# Patient Record
Sex: MALE | Race: BLACK OR AFRICAN AMERICAN | NOT HISPANIC OR LATINO | Employment: OTHER | URBAN - METROPOLITAN AREA
[De-identification: names, ages, dates, MRNs, and addresses within clinical notes are randomized per-mention and may not be internally consistent; named-entity substitution may affect disease eponyms.]

---

## 2017-01-25 ENCOUNTER — OFFICE VISIT (OUTPATIENT)
Dept: PAIN MEDICINE | Facility: CLINIC | Age: 52
End: 2017-01-25

## 2017-01-25 ENCOUNTER — RESULTS ENCOUNTER (OUTPATIENT)
Dept: PAIN MEDICINE | Facility: CLINIC | Age: 52
End: 2017-01-25

## 2017-01-25 VITALS
TEMPERATURE: 98.1 F | HEART RATE: 82 BPM | WEIGHT: 315 LBS | HEIGHT: 69 IN | RESPIRATION RATE: 18 BRPM | BODY MASS INDEX: 46.65 KG/M2 | OXYGEN SATURATION: 97 %

## 2017-01-25 DIAGNOSIS — G89.29 OTHER CHRONIC PAIN: Primary | ICD-10-CM

## 2017-01-25 DIAGNOSIS — M51.36 DDD (DEGENERATIVE DISC DISEASE), LUMBAR: ICD-10-CM

## 2017-01-25 DIAGNOSIS — M17.0 OSTEOARTHRITIS OF BOTH KNEES, UNSPECIFIED OSTEOARTHRITIS TYPE: ICD-10-CM

## 2017-01-25 DIAGNOSIS — G89.29 OTHER CHRONIC PAIN: ICD-10-CM

## 2017-01-25 DIAGNOSIS — Z79.899 ENCOUNTER FOR LONG-TERM (CURRENT) USE OF HIGH-RISK MEDICATION: ICD-10-CM

## 2017-01-25 DIAGNOSIS — M54.16 LUMBAR RADICULOPATHY: ICD-10-CM

## 2017-01-25 PROCEDURE — 99214 OFFICE O/P EST MOD 30 MIN: CPT | Performed by: NURSE PRACTITIONER

## 2017-01-25 RX ORDER — ALLOPURINOL 100 MG/1
TABLET ORAL
Refills: 1 | COMMUNITY
Start: 2017-01-20

## 2017-01-25 RX ORDER — HYDROCODONE BITARTRATE AND ACETAMINOPHEN 10; 325 MG/1; MG/1
1 TABLET ORAL EVERY 6 HOURS PRN
Qty: 120 TABLET | Refills: 0 | Status: SHIPPED | OUTPATIENT
Start: 2017-01-25 | End: 2017-02-24 | Stop reason: SDUPTHER

## 2017-01-25 RX ORDER — COLCHICINE 0.6 MG/1
TABLET, FILM COATED ORAL
COMMUNITY
Start: 2017-01-21

## 2017-01-25 RX ORDER — GABAPENTIN 300 MG/1
600 CAPSULE ORAL 3 TIMES DAILY
Qty: 180 CAPSULE | Refills: 1 | Status: SHIPPED | OUTPATIENT
Start: 2017-01-25 | End: 2017-03-24 | Stop reason: SDUPTHER

## 2017-01-25 NOTE — PROGRESS NOTES
CHIEF COMPLAINT  Knee pain, gout    Subjective   Artie Cameron is a 51 y.o. male  who presents to the office for follow-up.He has a history of chronic bilateral knee pain and low back pain.    He saw Dr. Boyd 12-.  Nothing surgical to offer because of his medical co-morbidities.      He reports that he had a severe case of gout over the weekend, went to the ER, is improving with medication (Allopurinol and Colchicine were started).    He reports knee and low back pain.  It is constant.  Today it is an 8/10 in severity.  Continues with Hydrocodone 10/325 4/day and gabapentin 300 mg 4/day. Denies any side effects from the regimen. Denies any side effects from the regimen. This helps decrease his pain moderately. ADL's by self.     Knee Pain    Incident onset: chronic. The pain is present in the left knee and right knee. The quality of the pain is described as aching (throbbing). The pain is at a severity of 8/10. The pain is severe. The pain has been worsening since onset. Associated symptoms include an inability to bear weight and a loss of motion. Pertinent negatives include no numbness. The symptoms are aggravated by movement and weight bearing. He has tried rest (norco) for the symptoms. The treatment provided moderate relief.   Back Pain   This is a new problem. The problem occurs constantly. The problem has been gradually worsening since onset. The pain is present in the lumbar spine. The quality of the pain is described as aching and burning. The pain is at a severity of 8/10. The pain is moderate. Associated symptoms include weakness. Pertinent negatives include no chest pain, dysuria, fever, headaches or numbness. Risk factors include lack of exercise, obesity and sedentary lifestyle. He has tried analgesics for the symptoms. The treatment provided mild relief.      PEG Assessment   What number best describes your pain on average in the past week?8  What number best describes how, during the past week,  "pain has interfered with your enjoyment of life?8  What number best describes how, during the past week, pain has interfered with your general activity?  8    The following portions of the patient's history were reviewed and updated as appropriate: allergies, current medications, past family history, past medical history, past social history, past surgical history and problem list.    Review of Systems   Constitutional: Negative for chills and fever.   Respiratory: Positive for shortness of breath.    Cardiovascular: Negative for chest pain.   Gastrointestinal: Negative for constipation, diarrhea, nausea and vomiting.   Genitourinary: Negative for difficulty urinating and dysuria.   Musculoskeletal: Positive for back pain.        Knee pain, gout   Neurological: Positive for weakness. Negative for dizziness, light-headedness, numbness and headaches.   Psychiatric/Behavioral: Negative for confusion, hallucinations, self-injury, sleep disturbance and suicidal ideas. The patient is not nervous/anxious.      Vitals:    01/25/17 0830   Pulse: 82   Resp: 18   Temp: 98.1 °F (36.7 °C)   SpO2: 97%   Weight: (!) 317 lb (144 kg)   Height: 69\" (175.3 cm)   PainSc:   8   PainLoc: Knee     Objective   Physical Exam   Constitutional: He is oriented to person, place, and time. Vital signs are normal. He appears well-developed and well-nourished. He is cooperative.   HENT:   Head: Normocephalic and atraumatic.   Nose: Nose normal.   Eyes: Conjunctivae and lids are normal.   Cardiovascular:   No heart beat with auscultation, LVAD patient    Pulmonary/Chest: Effort normal and breath sounds normal. No respiratory distress.   Abdominal: Normal appearance.   Musculoskeletal:        Right knee: He exhibits decreased range of motion and effusion. Tenderness found.        Left knee: He exhibits decreased range of motion and effusion. Tenderness found.        Lumbar back: He exhibits tenderness.   Neurological: He is alert and oriented to " person, place, and time. Gait (wheelchair) abnormal.   Patient reports dysthesias of BLE's   Skin: Skin is warm, dry and intact.   Psychiatric: He has a normal mood and affect. His speech is normal and behavior is normal. Judgment and thought content normal. Cognition and memory are normal.   Nursing note and vitals reviewed.    Assessment/Plan   Artie was seen today for knee pain.    Diagnoses and all orders for this visit:    Other chronic pain    Osteoarthritis of both knees, unspecified osteoarthritis type    DDD (degenerative disc disease), lumbar    Lumbar radiculopathy    Encounter for long-term (current) use of high-risk medication    Other orders  -     HYDROcodone-acetaminophen (NORCO)  MG per tablet; Take 1 tablet by mouth Every 6 (Six) Hours As Needed for moderate pain (4-6).  -     gabapentin (NEURONTIN) 300 MG capsule; Take 2 capsules by mouth 3 (Three) Times a Day.      --- Increase Neurontin to 600 mg TID  --- The urine drug screen confirmation from 6-14-16 has been reviewed and the result is appropriate based on patient history and NATALIE report  --- Refill Hydrocodone. Patient appears stable with current regimen. No adverse effects. Regarding continuation of opioids, there is no evidence of aberrant behavior or any red flags. The patient continues with appropriate response to opioid therapy. ADL's remain intact by self.   --- Follow-up 1 month          NATALIE REPORT    As part of the patient's treatment plan, I am prescribing controlled substances. The patient has been made aware of appropriate use of such medications, including potential risk of somnolence, limited ability to drive and/or work safely, and the potential for dependence or overdose. It has also bee made clear that these medications are for use by this patient only, without concomitant use of alcohol or other substances unless prescribed.     Patient has completed prescribing agreement detailing terms of continued prescribing of  controlled substances, including monitoring NATALIE reports, urine drug screening, and pill counts if necessary. The patient is aware that inappropriate use will results in cessation of prescribing such medications.    NATALIE report has been reviewed and scanned into the patient's chart.    Date of last NATALIE : 12- need new    History and physical exam exhibit continued safe and appropriate use of controlled substances.    EMR Dragon/Transcription disclaimer:   Much of this encounter note is an electronic transcription/translation of spoken language to printed text. The electronic translation of spoken language may permit erroneous, or at times, nonsensical words or phrases to be inadvertently transcribed; Although I have reviewed the note for such errors, some may still exist.

## 2017-01-25 NOTE — MR AVS SNAPSHOT
Artie Cameron   1/25/2017 8:40 AM   Office Visit    Dept Phone:  [phone]   Encounter #:  [id number]    Provider:  SANTOSH Green   Department:  [facility] PAIN MANAGEMENT                Your Full Care Plan              Today's Medication Changes          These changes are accurate as of: 1/25/17  8:59 AM.  If you have any questions, ask your nurse or doctor.               Medication(s)that have changed:     gabapentin 300 MG capsule   Commonly known as:  NEURONTIN   Take 2 capsules by mouth 3 (Three) Times a Day.   What changed:    - how much to take  - when to take this   Changed by:  SANTOSH Green            Where to Get Your Medications      These medications were sent to [facility] [address] [place] [address] [phone] [facility] [phone] [address] [address]    Hours:  24-hours Phone:  [phone]     gabapentin 300 MG capsule         You can get these medications from any pharmacy     Bring a paper prescription for each of these medications     HYDROcodone-acetaminophen  MG per tablet                  Your Updated Medication List          This list is accurate as of: 1/25/17  8:59 AM.  Always use your most recent med list.                ACCU-CHEK ABDELRAHMAN PLUS test strip   Generic drug:  glucose blood       ACCU-CHEK ABDELRAHMAN PLUS W/DEVICE kit       ACCU-CHEK SOFTCLIX LANCETS lancets       ADVAIR DISKUS 250-50 MCG/DOSE DISKUS   Generic drug:  fluticasone-salmeterol       allopurinol 100 MG tablet   Commonly known as:  ZYLOPRIM       carvedilol 25 MG tablet   Commonly known as:  COREG       clopidogrel 75 MG tablet   Commonly known as:  PLAVIX       COLCRYS 0.6 MG tablet   Generic drug:  colchicine       doxycycline 100 MG capsule   Commonly known as:  MONODOX       FLUOCINOLONE ACETONIDE SCALP 0.01 % oil       fluticasone 50 MCG/ACT nasal spray   Commonly known as:   "FLONASE       gabapentin 300 MG capsule   Commonly known as:  NEURONTIN   Take 2 capsules by mouth 3 (Three) Times a Day.       HUMALOG 100 UNIT/ML injection   Generic drug:  insulin lispro       hydrALAZINE 50 MG tablet   Commonly known as:  APRESOLINE       HYDROcodone-acetaminophen  MG per tablet   Commonly known as:  NORCO   Take 1 tablet by mouth Every 6 (Six) Hours As Needed for moderate pain (4-6).       INCRUSE ELLIPTA 62.5 MCG/INH aerosol powder    Generic drug:  Umeclidinium Bromide       Insulin Syringe 31G X 5/16\" 1 ML misc       ipratropium-albuterol 0.5-2.5 mg/mL nebulizer   Commonly known as:  DUO-NEB       isosorbide mononitrate 30 MG 24 hr tablet   Commonly known as:  IMDUR       LEVEMIR FLEXTOUCH 100 UNIT/ML injection   Generic drug:  insulin detemir       levothyroxine 100 MCG tablet   Commonly known as:  SYNTHROID, LEVOTHROID       * lisinopril 20 MG tablet   Commonly known as:  PRINIVIL,ZESTRIL       * lisinopril 10 MG tablet   Commonly known as:  PRINIVIL,ZESTRIL       MAGNESIUM-OXIDE 400 (241.3 MG) MG tablet tablet   Generic drug:  magnesium oxide       omeprazole 20 MG capsule   Commonly known as:  priLOSEC       potassium chloride 20 MEQ CR tablet   Commonly known as:  K-DUR,KLOR-CON       potassium chloride ER 20 MEQ tablet controlled-release ER tablet   Commonly known as:  K-TAB       predniSONE 20 MG tablet   Commonly known as:  DELTASONE       * PROAIR  (90 BASE) MCG/ACT inhaler   Generic drug:  albuterol       * albuterol (2.5 MG/3ML) 0.083% nebulizer solution   Commonly known as:  PROVENTIL       SPIRIVA HANDIHALER 18 MCG per inhalation capsule   Generic drug:  tiotropium       spironolactone 25 MG tablet   Commonly known as:  ALDACTONE       torsemide 20 MG tablet   Commonly known as:  DEMADEX       warfarin 1 MG tablet   Commonly known as:  COUMADIN       * Notice:  This list has 4 medication(s) that are the same as other medications prescribed for you. Read the " "directions carefully, and ask your doctor or other care provider to review them with you.            You Were Diagnosed With        Codes Comments    Other chronic pain    -  Primary ICD-10-CM: G89.29  ICD-9-CM: 338.29     Osteoarthritis of both knees, unspecified osteoarthritis type     ICD-10-CM: M17.0  ICD-9-CM: 715.96     DDD (degenerative disc disease), lumbar     ICD-10-CM: M51.36  ICD-9-CM: 722.52     Lumbar radiculopathy     ICD-10-CM: M54.16  ICD-9-CM: 724.4     Encounter for long-term (current) use of high-risk medication     ICD-10-CM: Z79.899  ICD-9-CM: V58.69       Instructions     None    Patient Instructions History      Upcoming Appointments     Visit Type Date Time Department    OFFICE VISIT 2017  8:40 AM [facility]      [employer] Signup     [facility] [employer] allows you to send messages to your doctor, view your test results, renew your prescriptions, schedule appointments, and more. To sign up, go to [employer] and click on the Sign Up Now link in the New User? box. Enter your [employer] Activation Code exactly as it appears below along with the last four digits of your Social Security Number and your Date of Birth () to complete the sign-up process. If you do not sign up before the expiration date, you must request a new code.    [employer] Activation Code: [email or web address]  Expires: 2017  5:34 AM    If you have questions, you can email [email or web address] or call [phone] to talk to our [employer] staff. Remember, [employer] is NOT to be used for urgent needs. For medical emergencies, dial 911.               Other Info from Your Visit           Allergies     Phenergan [Promethazine Hcl]  Shortness Of Breath      Reason for Visit     Knee Pain           Vital Signs     Pulse Temperature Respirations Height Weight Oxygen Saturation    82 98.1 °F (36.7 °C) 18 69\" (175.3 cm) 317 lb (144 kg) 97%    Body Mass Index Smoking Status                46.81 kg/m2 " Former Smoker          Problems and Diagnoses Noted     Chronic pain    Degeneration of lumbar or lumbosacral intervertebral disc    Encounter for long-term (current) use of high-risk medication    Lumbar nerve root disorder    Osteoarthritis of both knees

## 2017-02-24 ENCOUNTER — OFFICE VISIT (OUTPATIENT)
Dept: PAIN MEDICINE | Facility: CLINIC | Age: 52
End: 2017-02-24

## 2017-02-24 VITALS — WEIGHT: 315 LBS | BODY MASS INDEX: 46.65 KG/M2 | HEIGHT: 69 IN | RESPIRATION RATE: 16 BRPM

## 2017-02-24 DIAGNOSIS — Z79.899 ENCOUNTER FOR LONG-TERM (CURRENT) USE OF HIGH-RISK MEDICATION: ICD-10-CM

## 2017-02-24 DIAGNOSIS — M17.0 OSTEOARTHRITIS OF BOTH KNEES, UNSPECIFIED OSTEOARTHRITIS TYPE: ICD-10-CM

## 2017-02-24 DIAGNOSIS — M54.16 LUMBAR RADICULOPATHY: ICD-10-CM

## 2017-02-24 DIAGNOSIS — M51.36 DDD (DEGENERATIVE DISC DISEASE), LUMBAR: ICD-10-CM

## 2017-02-24 DIAGNOSIS — G89.29 OTHER CHRONIC PAIN: Primary | ICD-10-CM

## 2017-02-24 LAB
POC AMPHETAMINES: NEGATIVE
POC BARBITURATES: NEGATIVE
POC BENZODIAZEPHINES: NEGATIVE
POC COCAINE: NEGATIVE
POC METHADONE: NEGATIVE
POC METHAMPHETAMINE SCREEN URINE: NEGATIVE
POC OPIATES: POSITIVE
POC OXYCODONE: NEGATIVE
POC PHENCYCLIDINE: NEGATIVE
POC PROPOXYPHENE: NEGATIVE
POC THC: NEGATIVE
POC TRICYCLIC ANTIDEPRESSANTS: NEGATIVE

## 2017-02-24 PROCEDURE — 80305 DRUG TEST PRSMV DIR OPT OBS: CPT | Performed by: NURSE PRACTITIONER

## 2017-02-24 PROCEDURE — 99213 OFFICE O/P EST LOW 20 MIN: CPT | Performed by: NURSE PRACTITIONER

## 2017-02-24 RX ORDER — HYDROCODONE BITARTRATE AND ACETAMINOPHEN 10; 325 MG/1; MG/1
1 TABLET ORAL EVERY 6 HOURS PRN
Qty: 120 TABLET | Refills: 0 | Status: SHIPPED | OUTPATIENT
Start: 2017-02-24 | End: 2017-03-24 | Stop reason: SDUPTHER

## 2017-02-24 NOTE — PROGRESS NOTES
CHIEF COMPLAINT  Pt continues with significant bilateral LBP,basically unchanged,tolerably controlled overall.    Subjective   Artie Cameron is a 52 y.o. male  who presents to the office for follow-up.He has a history of chronic low back and bilateral knee pain.  Pain is unchanged today, moderately controlled overall.  Pain is constant.  Ranges from 6-8/10 in severity. Continues with Hydrocodone 10/325 4/day and gabapentin 600 mg 3/day. Denies any side effects from the regimen. Denies any side effects from the regimen. This helps decrease his pain moderately. ADL's by self.     Knee Pain    Incident onset: chronic. The pain is present in the left knee and right knee. The quality of the pain is described as aching (throbbing). The pain is at a severity of 8/10. The pain is severe. The pain has been worsening since onset. Associated symptoms include an inability to bear weight and a loss of motion. Pertinent negatives include no numbness. The symptoms are aggravated by movement and weight bearing. He has tried rest (norco) for the symptoms. The treatment provided moderate relief.   Back Pain   This is a new problem. The problem occurs constantly. The problem is unchanged. The pain is present in the lumbar spine. The quality of the pain is described as aching and burning. The pain is at a severity of 8/10. The pain is moderate. Associated symptoms include weakness. Pertinent negatives include no chest pain, dysuria, fever, headaches or numbness. Risk factors include lack of exercise, obesity and sedentary lifestyle. He has tried analgesics for the symptoms. The treatment provided mild relief.      PEG Assessment   What number best describes your pain on average in the past week?8  What number best describes how, during the past week, pain has interfered with your enjoyment of life?8  What number best describes how, during the past week, pain has interfered with your general activity?  8    The following portions of the  "patient's history were reviewed and updated as appropriate: allergies, current medications, past family history, past medical history, past social history, past surgical history and problem list.    Review of Systems   Constitutional: Negative for chills and fever.   Respiratory: Positive for apnea and shortness of breath. Negative for chest tightness.    Cardiovascular: Negative for chest pain.   Gastrointestinal: Negative for constipation, diarrhea, nausea and vomiting.   Genitourinary: Negative for difficulty urinating and dysuria.   Musculoskeletal: Positive for back pain.        Knee pain, gout   Neurological: Positive for weakness. Negative for dizziness, light-headedness, numbness and headaches.   Psychiatric/Behavioral: Negative for confusion, hallucinations, self-injury, sleep disturbance and suicidal ideas. The patient is not nervous/anxious.      Vitals:    02/24/17 0802   Resp: 16   Weight: (!) 324 lb (147 kg)   Height: 69\" (175.3 cm)   PainSc: 8  Comment: LBP bilaterally ranges from 6-8/10   PainLoc: Back     Objective   Physical Exam   Constitutional: He is oriented to person, place, and time. Vital signs are normal. He appears well-developed and well-nourished. He is cooperative.   HENT:   Head: Normocephalic and atraumatic.   Nose: Nose normal.   Eyes: Conjunctivae and lids are normal.   Cardiovascular:   No heart beat with auscultation, LVAD patient    Pulmonary/Chest: Effort normal and breath sounds normal. No respiratory distress.   Abdominal: Normal appearance.   Musculoskeletal:        Right knee: He exhibits decreased range of motion. Tenderness found.        Left knee: He exhibits decreased range of motion. Tenderness found.        Lumbar back: He exhibits tenderness.   Neurological: He is alert and oriented to person, place, and time. Gait (wheelchair) abnormal.   Patient reports dysthesias of BLE's   Skin: Skin is warm, dry and intact.   Psychiatric: He has a normal mood and affect. His " speech is normal and behavior is normal. Judgment and thought content normal. Cognition and memory are normal.   Nursing note and vitals reviewed.    Assessment/Plan   Artie was seen today for back pain.    Diagnoses and all orders for this visit:    Other chronic pain  -     Urine Drug Screen Confirmation  -     POC Urine Drug Screen, Triage    DDD (degenerative disc disease), lumbar  -     Urine Drug Screen Confirmation  -     POC Urine Drug Screen, Triage    Lumbar radiculopathy  -     Urine Drug Screen Confirmation  -     POC Urine Drug Screen, Triage    Osteoarthritis of both knees, unspecified osteoarthritis type  -     Urine Drug Screen Confirmation  -     POC Urine Drug Screen, Triage    Encounter for long-term (current) use of high-risk medication  -     Urine Drug Screen Confirmation  -     POC Urine Drug Screen, Triage    Other orders  -     HYDROcodone-acetaminophen (NORCO)  MG per tablet; Take 1 tablet by mouth Every 6 (Six) Hours As Needed for moderate pain (4-6).      --- The urine drug screen confirmation from 6-14-16 has been reviewed and the result is appropriate based on patient history and NATALIE report  --- Refill Hydrocodone. Patient appears stable with current regimen. No adverse effects. Regarding continuation of opioids, there is no evidence of aberrant behavior or any red flags. The patient continues with appropriate response to opioid therapy. ADL's remain intact by self.   --- Follow-up 1 month         NATALIE REPORT  As part of the patient's treatment plan, I am prescribing controlled substances. The patient has been made aware of appropriate use of such medications, including potential risk of somnolence, limited ability to drive and/or work safely, and the potential for dependence or overdose. It has also bee made clear that these medications are for use by this patient only, without concomitant use of alcohol or other substances unless prescribed.     Patient has completed  prescribing agreement detailing terms of continued prescribing of controlled substances, including monitoring NATALIE reports, urine drug screening, and pill counts if necessary. The patient is aware that inappropriate use will results in cessation of prescribing such medications.    NATALIE report has been reviewed and scanned into the patient's chart.    Date of last NATALIE : 2-    History and physical exam exhibit continued safe and appropriate use of controlled substances.    EMR Dragon/Transcription disclaimer:   Much of this encounter note is an electronic transcription/translation of spoken language to printed text. The electronic translation of spoken language may permit erroneous, or at times, nonsensical words or phrases to be inadvertently transcribed; Although I have reviewed the note for such errors, some may still exist.

## 2017-03-24 ENCOUNTER — OFFICE VISIT (OUTPATIENT)
Dept: PAIN MEDICINE | Facility: CLINIC | Age: 52
End: 2017-03-24

## 2017-03-24 VITALS
OXYGEN SATURATION: 96 % | HEIGHT: 69 IN | WEIGHT: 315 LBS | TEMPERATURE: 98.4 F | RESPIRATION RATE: 16 BRPM | HEART RATE: 82 BPM | BODY MASS INDEX: 46.65 KG/M2

## 2017-03-24 DIAGNOSIS — M17.0 OSTEOARTHRITIS OF BOTH KNEES, UNSPECIFIED OSTEOARTHRITIS TYPE: ICD-10-CM

## 2017-03-24 DIAGNOSIS — M25.562 PAIN IN BOTH KNEES, UNSPECIFIED CHRONICITY: ICD-10-CM

## 2017-03-24 DIAGNOSIS — Z79.899 ENCOUNTER FOR LONG-TERM (CURRENT) USE OF HIGH-RISK MEDICATION: ICD-10-CM

## 2017-03-24 DIAGNOSIS — M25.561 PAIN IN BOTH KNEES, UNSPECIFIED CHRONICITY: ICD-10-CM

## 2017-03-24 DIAGNOSIS — G89.29 OTHER CHRONIC PAIN: Primary | ICD-10-CM

## 2017-03-24 DIAGNOSIS — M51.36 DDD (DEGENERATIVE DISC DISEASE), LUMBAR: ICD-10-CM

## 2017-03-24 PROCEDURE — 99213 OFFICE O/P EST LOW 20 MIN: CPT | Performed by: NURSE PRACTITIONER

## 2017-03-24 RX ORDER — WARFARIN SODIUM 5 MG/1
TABLET ORAL
Refills: 3 | COMMUNITY
Start: 2017-03-06

## 2017-03-24 RX ORDER — HYDROCODONE BITARTRATE AND ACETAMINOPHEN 10; 325 MG/1; MG/1
1 TABLET ORAL EVERY 6 HOURS PRN
Qty: 120 TABLET | Refills: 0 | Status: SHIPPED | OUTPATIENT
Start: 2017-03-24 | End: 2017-05-24 | Stop reason: SDUPTHER

## 2017-03-24 RX ORDER — LEVOTHYROXINE SODIUM 0.05 MG/1
TABLET ORAL
Refills: 6 | COMMUNITY
Start: 2017-03-02

## 2017-03-24 RX ORDER — GABAPENTIN 300 MG/1
600 CAPSULE ORAL 3 TIMES DAILY
Qty: 180 CAPSULE | Refills: 1 | Status: SHIPPED | OUTPATIENT
Start: 2017-03-24 | End: 2017-07-17 | Stop reason: SDUPTHER

## 2017-03-24 NOTE — PROGRESS NOTES
CHIEF COMPLAINT    Since last visit on 2/24/17, pt states knee pain is getting worse, but back pain is unchanged.     Subjective   Artie Cameron is a 52 y.o. male  who presents to the office for follow-up.He has a history of chronic knee and back pain.      Pain is unchanged today, moderately controlled overall. Pain is constant. Ranges from 6-8/10 in severity. Continues with Hydrocodone 10/325 4/day and gabapentin 600 mg 3/day. Denies any side effects from the regimen. Denies any side effects from the regimen. This helps decrease his pain moderately. ADL's by self.     Knee Pain    Incident onset: chronic. The pain is present in the left knee and right knee. The quality of the pain is described as aching (throbbing). The pain is at a severity of 8/10. The pain is severe. The pain has been worsening since onset. Associated symptoms include an inability to bear weight and a loss of motion. Pertinent negatives include no numbness. The symptoms are aggravated by movement and weight bearing. He has tried rest (norco) for the symptoms. The treatment provided moderate relief.   Back Pain   This is a new problem. The problem occurs constantly. The problem is unchanged. The pain is present in the lumbar spine. The quality of the pain is described as aching and burning. The pain is at a severity of 8/10. The pain is moderate. Associated symptoms include weakness. Pertinent negatives include no abdominal pain, chest pain, dysuria, fever, headaches or numbness. Risk factors include lack of exercise, obesity and sedentary lifestyle. He has tried analgesics for the symptoms. The treatment provided mild relief.      PEG Assessment   What number best describes your pain on average in the past week?8  What number best describes how, during the past week, pain has interfered with your enjoyment of life?8  What number best describes how, during the past week, pain has interfered with your general activity?  8    The following portions  "of the patient's history were reviewed and updated as appropriate: allergies, current medications, past family history, past medical history, past social history, past surgical history and problem list.    Review of Systems   Constitutional: Negative for activity change, chills and fever.   Respiratory: Positive for apnea, shortness of breath and wheezing. Negative for cough and chest tightness.    Cardiovascular: Negative for chest pain.   Gastrointestinal: Negative for abdominal pain, constipation, diarrhea, nausea and vomiting.   Genitourinary: Negative for difficulty urinating and dysuria.   Musculoskeletal: Positive for back pain.        Knee pain, gout   Neurological: Positive for weakness. Negative for dizziness, light-headedness, numbness and headaches.   Psychiatric/Behavioral: Negative for agitation, confusion, hallucinations, self-injury, sleep disturbance and suicidal ideas. The patient is not nervous/anxious.      Vitals:    03/24/17 0755   Pulse: 82   Resp: 16   Temp: 98.4 °F (36.9 °C)   SpO2: 96%   Weight: (!) 330 lb (150 kg)   Height: 69\" (175.3 cm)   PainSc: 8  Comment: and knee   PainLoc: Back     Objective   Physical Exam   Constitutional: He is oriented to person, place, and time. Vital signs are normal. He appears well-developed and well-nourished. He is cooperative.   HENT:   Head: Normocephalic and atraumatic.   Nose: Nose normal.   Eyes: Conjunctivae and lids are normal.   Cardiovascular:   No heart beat with auscultation, LVAD patient    Pulmonary/Chest: Effort normal and breath sounds normal. No respiratory distress.   Abdominal: Normal appearance.   Musculoskeletal:        Right knee: He exhibits decreased range of motion. Tenderness found.        Left knee: He exhibits decreased range of motion. Tenderness found.   Neurological: He is alert and oriented to person, place, and time. Gait (wheelchair) abnormal.   Patient reports dysthesias of BLE's   Skin: Skin is warm, dry and intact. "   Psychiatric: He has a normal mood and affect. His speech is normal and behavior is normal. Judgment and thought content normal. Cognition and memory are normal.   Nursing note and vitals reviewed.    Assessment/Plan   Artie was seen today for back pain.    Diagnoses and all orders for this visit:    Other chronic pain    Osteoarthritis of both knees, unspecified osteoarthritis type    Pain in both knees, unspecified chronicity    DDD (degenerative disc disease), lumbar    Encounter for long-term (current) use of high-risk medication    Other orders  -     HYDROcodone-acetaminophen (NORCO)  MG per tablet; Take 1 tablet by mouth Every 6 (Six) Hours As Needed for Moderate Pain (4-6).  -     gabapentin (NEURONTIN) 300 MG capsule; Take 2 capsules by mouth 3 (Three) Times a Day.      --- Refill Hydrocodone.  Patient appears stable with current regimen. No adverse effects. Regarding continuation of opioids, there is no evidence of aberrant behavior or any red flags.  The patient continues with appropriate response to opioid therapy. ADL's remain intact by self.   --- The urine drug screen confirmation from 2- has been reviewed and the result is appropriate based on patient history and NATALIE report  --- Follow-up 2 months          NATALIE REPORT  As part of the patient's treatment plan, I am prescribing controlled substances. The patient has been made aware of appropriate use of such medications, including potential risk of somnolence, limited ability to drive and/or work safely, and the potential for dependence or overdose. It has also bee made clear that these medications are for use by this patient only, without concomitant use of alcohol or other substances unless prescribed.     Patient has completed prescribing agreement detailing terms of continued prescribing of controlled substances, including monitoring NATALIE reports, urine drug screening, and pill counts if necessary. The patient is aware that  inappropriate use will results in cessation of prescribing such medications.    NATALIE report has been reviewed and scanned into the patient's chart.    Date of last NATALIE : 3-    History and physical exam exhibit continued safe and appropriate use of controlled substances.    EMR Dragon/Transcription disclaimer:   Much of this encounter note is an electronic transcription/translation of spoken language to printed text. The electronic translation of spoken language may permit erroneous, or at times, nonsensical words or phrases to be inadvertently transcribed; Although I have reviewed the note for such errors, some may still exist.

## 2017-05-24 ENCOUNTER — OFFICE VISIT (OUTPATIENT)
Dept: PAIN MEDICINE | Facility: CLINIC | Age: 52
End: 2017-05-24

## 2017-05-24 VITALS
OXYGEN SATURATION: 99 % | TEMPERATURE: 97.1 F | HEIGHT: 69 IN | HEART RATE: 63 BPM | RESPIRATION RATE: 16 BRPM | WEIGHT: 315 LBS | BODY MASS INDEX: 46.65 KG/M2

## 2017-05-24 DIAGNOSIS — Z96.652 HISTORY OF TOTAL KNEE ARTHROPLASTY, LEFT: ICD-10-CM

## 2017-05-24 DIAGNOSIS — M17.0 OSTEOARTHRITIS OF BOTH KNEES, UNSPECIFIED OSTEOARTHRITIS TYPE: ICD-10-CM

## 2017-05-24 DIAGNOSIS — M51.36 DDD (DEGENERATIVE DISC DISEASE), LUMBAR: ICD-10-CM

## 2017-05-24 DIAGNOSIS — M54.16 LUMBAR RADICULOPATHY: ICD-10-CM

## 2017-05-24 DIAGNOSIS — Z79.899 ENCOUNTER FOR LONG-TERM (CURRENT) USE OF HIGH-RISK MEDICATION: ICD-10-CM

## 2017-05-24 DIAGNOSIS — G89.29 OTHER CHRONIC PAIN: Primary | ICD-10-CM

## 2017-05-24 PROCEDURE — 99213 OFFICE O/P EST LOW 20 MIN: CPT | Performed by: NURSE PRACTITIONER

## 2017-05-24 RX ORDER — HYDROCODONE BITARTRATE AND ACETAMINOPHEN 10; 325 MG/1; MG/1
1 TABLET ORAL EVERY 6 HOURS PRN
Qty: 120 TABLET | Refills: 0 | Status: SHIPPED | OUTPATIENT
Start: 2017-05-24 | End: 2017-06-20 | Stop reason: SDUPTHER

## 2017-06-20 RX ORDER — HYDROCODONE BITARTRATE AND ACETAMINOPHEN 10; 325 MG/1; MG/1
1 TABLET ORAL EVERY 6 HOURS PRN
Qty: 120 TABLET | Refills: 0 | Status: SHIPPED | OUTPATIENT
Start: 2017-06-20 | End: 2017-07-17 | Stop reason: SDUPTHER

## 2017-06-20 NOTE — TELEPHONE ENCOUNTER
Medication Refill Request    Date of phone call: 6/16/17    Medication being requested: Hydro-apap 10 sig: q6hrs  Qty: 120    Date of last visit: 5/24/17    Date of last refill: 5/24/17    NATALIE up to date?: 5/23/17    Next Follow up?: 7/17/17    Any new pertinent information? (i.e, new medication allergies, new use of medications, change in patient's health or condition, non-compliance or inconsistency with prescribing agreement?): n/a

## 2017-07-17 ENCOUNTER — OFFICE VISIT (OUTPATIENT)
Dept: PAIN MEDICINE | Facility: CLINIC | Age: 52
End: 2017-07-17

## 2017-07-17 VITALS
HEIGHT: 69 IN | BODY MASS INDEX: 46.65 KG/M2 | HEART RATE: 69 BPM | TEMPERATURE: 96.2 F | OXYGEN SATURATION: 92 % | WEIGHT: 315 LBS | RESPIRATION RATE: 16 BRPM

## 2017-07-17 DIAGNOSIS — Z79.899 ENCOUNTER FOR LONG-TERM (CURRENT) USE OF HIGH-RISK MEDICATION: ICD-10-CM

## 2017-07-17 DIAGNOSIS — M51.36 DDD (DEGENERATIVE DISC DISEASE), LUMBAR: ICD-10-CM

## 2017-07-17 DIAGNOSIS — M17.0 OSTEOARTHRITIS OF BOTH KNEES, UNSPECIFIED OSTEOARTHRITIS TYPE: ICD-10-CM

## 2017-07-17 DIAGNOSIS — M54.16 LUMBAR RADICULOPATHY: ICD-10-CM

## 2017-07-17 DIAGNOSIS — G89.29 OTHER CHRONIC PAIN: Primary | ICD-10-CM

## 2017-07-17 PROCEDURE — 99213 OFFICE O/P EST LOW 20 MIN: CPT | Performed by: NURSE PRACTITIONER

## 2017-07-17 RX ORDER — BUDESONIDE 0.5 MG/2ML
0.5 INHALANT ORAL 2 TIMES DAILY
COMMUNITY

## 2017-07-17 RX ORDER — HYDROCODONE BITARTRATE AND ACETAMINOPHEN 10; 325 MG/1; MG/1
1 TABLET ORAL EVERY 6 HOURS PRN
Qty: 120 TABLET | Refills: 0 | Status: SHIPPED | OUTPATIENT
Start: 2017-07-17 | End: 2017-08-14 | Stop reason: SDUPTHER

## 2017-07-17 RX ORDER — GABAPENTIN 300 MG/1
600 CAPSULE ORAL 3 TIMES DAILY
Qty: 180 CAPSULE | Refills: 2 | Status: SHIPPED | OUTPATIENT
Start: 2017-07-17 | End: 2017-09-14 | Stop reason: SDUPTHER

## 2017-07-17 RX ORDER — FORMOTEROL FUMARATE 20 UG/2ML
SOLUTION RESPIRATORY (INHALATION) 2 TIMES DAILY
COMMUNITY

## 2017-07-17 NOTE — PROGRESS NOTES
CHIEF COMPLAINT  Pt is f/u back and knee pain. States pain is unchanged.    Subjective   Artie Cameron is a 52 y.o. male  who presents to the office for follow-up.He has a history of chronic back and knee pain.    Continues with Hydrocodone 10/325 4/day and gabapentin 600 mg 3/day. Reports moderate reduction in pain with this regimen. Denies any side effects from the regimen. This helps decrease his pain moderately. ADL's by self.     He reports that he is going to have some sort of laparoscopic bariatric surgery with the goal of weight loss to move forward with right TKA.      Knee Pain    Incident onset: chronic. The pain is present in the left knee and right knee. The quality of the pain is described as aching (throbbing). The pain is at a severity of 8/10. The pain is severe. The pain has been constant since onset. Associated symptoms include an inability to bear weight and a loss of motion. Pertinent negatives include no numbness. The symptoms are aggravated by movement and weight bearing. He has tried rest (norco) for the symptoms. The treatment provided moderate relief.   Back Pain   This is a new problem. The problem occurs constantly. The problem is unchanged. The pain is present in the lumbar spine. The quality of the pain is described as aching and burning. The pain is at a severity of 8/10. The pain is moderate. Associated symptoms include weakness (pt in motorized chair). Pertinent negatives include no abdominal pain, chest pain, dysuria, fever, headaches or numbness. Risk factors include lack of exercise, obesity and sedentary lifestyle. He has tried analgesics for the symptoms. The treatment provided mild relief.      PEG Assessment   What number best describes your pain on average in the past week?8  What number best describes how, during the past week, pain has interfered with your enjoyment of life?8  What number best describes how, during the past week, pain has interfered with your general  "activity?  8    The following portions of the patient's history were reviewed and updated as appropriate: allergies, current medications, past family history, past medical history, past social history, past surgical history and problem list.    Review of Systems   Constitutional: Negative for activity change, chills and fever.   Respiratory: Positive for apnea, shortness of breath and wheezing. Negative for cough and chest tightness.    Cardiovascular: Negative for chest pain.   Gastrointestinal: Negative for abdominal pain, constipation, diarrhea, nausea and vomiting.   Genitourinary: Negative for difficulty urinating and dysuria.   Musculoskeletal: Positive for arthralgias and back pain.        Knee pain, gout   Neurological: Positive for weakness (pt in motorized chair). Negative for dizziness, light-headedness, numbness and headaches.   Psychiatric/Behavioral: Negative for agitation, confusion, hallucinations, self-injury, sleep disturbance and suicidal ideas. The patient is not nervous/anxious.      Vitals:    07/17/17 0743   BP: Comment: Pt has heart ware   Pulse: 69   Resp: 16   Temp: 96.2 °F (35.7 °C)   SpO2: 92%   Weight: (!) 330 lb (150 kg)   Height: 69\" (175.3 cm)   PainSc:   8   PainLoc: Back  Comment: and knee     Objective   Physical Exam   Constitutional: He is oriented to person, place, and time. Vital signs are normal. He appears well-developed and well-nourished. He is cooperative.   HENT:   Head: Normocephalic and atraumatic.   Nose: Nose normal.   Eyes: Conjunctivae and lids are normal.   Cardiovascular:   LVAD patient    Pulmonary/Chest: Effort normal. No respiratory distress.   Abdominal: Normal appearance.   Musculoskeletal:        Right knee: He exhibits decreased range of motion. Tenderness found.        Left knee: He exhibits decreased range of motion. Tenderness found.        Lumbar back: He exhibits tenderness.   Neurological: He is alert and oriented to person, place, and time. Gait " (wheelchair) abnormal.   Patient reports dysthesias of BLE's   Skin: Skin is warm, dry and intact.   Psychiatric: He has a normal mood and affect. His speech is normal and behavior is normal. Judgment and thought content normal. Cognition and memory are normal.   Nursing note and vitals reviewed.    Assessment/Plan   Artie was seen today for back pain.    Diagnoses and all orders for this visit:    Other chronic pain    DDD (degenerative disc disease), lumbar    Osteoarthritis of both knees, unspecified osteoarthritis type    Lumbar radiculopathy    Encounter for long-term (current) use of high-risk medication    Other orders  -     HYDROcodone-acetaminophen (NORCO)  MG per tablet; Take 1 tablet by mouth Every 6 (Six) Hours As Needed for Moderate Pain .  -     gabapentin (NEURONTIN) 300 MG capsule; Take 2 capsules by mouth 3 (Three) Times a Day.      --- Refill Hydrocodone. Patient appears stable with current regimen. No adverse effects. Regarding continuation of opioids, there is no evidence of aberrant behavior or any red flags.  The patient continues with appropriate response to opioid therapy. ADL's remain intact by self.   --- The urine drug screen confirmation from 2/24/2017 has been reviewed and the result is appropriate based on patient history and NATALIE report  --- ??? May refill   --- Follow-up 2 months          NATALIE REPORT  As part of the patient's treatment plan, I am prescribing controlled substances. The patient has been made aware of appropriate use of such medications, including potential risk of somnolence, limited ability to drive and/or work safely, and the potential for dependence or overdose. It has also bee made clear that these medications are for use by this patient only, without concomitant use of alcohol or other substances unless prescribed.     Patient has completed prescribing agreement detailing terms of continued prescribing of controlled substances, including monitoring NATALIE  reports, urine drug screening, and pill counts if necessary. The patient is aware that inappropriate use will results in cessation of prescribing such medications.    NATALIE report has been reviewed and scanned into the patient's chart.    Date of last NATALIE : 7/14/2017    History and physical exam exhibit continued safe and appropriate use of controlled substances.    EMR Dragon/Transcription disclaimer:   Much of this encounter note is an electronic transcription/translation of spoken language to printed text. The electronic translation of spoken language may permit erroneous, or at times, nonsensical words or phrases to be inadvertently transcribed; Although I have reviewed the note for such errors, some may still exist.

## 2017-08-14 NOTE — TELEPHONE ENCOUNTER
Medication Refill Request    Date of phone call: 8/14/17    Medication being requested: Hydrocodone-apap 10 sig: q6hrs  Qty: 120    Date of last visit: 7/17/17    Date of last refill: 7/17/17    NATALIE up to date?: 7/14/17    Next Follow up?: 9/14/17    Any new pertinent information? (i.e, new medication allergies, new use of medications, change in patient's health or condition, non-compliance or inconsistency with prescribing agreement?): n/a

## 2017-08-15 RX ORDER — HYDROCODONE BITARTRATE AND ACETAMINOPHEN 10; 325 MG/1; MG/1
1 TABLET ORAL EVERY 6 HOURS PRN
Qty: 120 TABLET | Refills: 0 | Status: SHIPPED | OUTPATIENT
Start: 2017-08-15 | End: 2017-09-14 | Stop reason: SDUPTHER

## 2017-09-14 ENCOUNTER — OFFICE VISIT (OUTPATIENT)
Dept: PAIN MEDICINE | Facility: CLINIC | Age: 52
End: 2017-09-14

## 2017-09-14 VITALS
OXYGEN SATURATION: 94 % | BODY MASS INDEX: 46.65 KG/M2 | WEIGHT: 315 LBS | TEMPERATURE: 98.3 F | HEIGHT: 69 IN | HEART RATE: 67 BPM | RESPIRATION RATE: 18 BRPM

## 2017-09-14 DIAGNOSIS — M51.36 DDD (DEGENERATIVE DISC DISEASE), LUMBAR: ICD-10-CM

## 2017-09-14 DIAGNOSIS — M25.562 PAIN IN BOTH KNEES, UNSPECIFIED CHRONICITY: ICD-10-CM

## 2017-09-14 DIAGNOSIS — Z79.899 ENCOUNTER FOR LONG-TERM (CURRENT) USE OF HIGH-RISK MEDICATION: ICD-10-CM

## 2017-09-14 DIAGNOSIS — M17.0 OSTEOARTHRITIS OF BOTH KNEES, UNSPECIFIED OSTEOARTHRITIS TYPE: ICD-10-CM

## 2017-09-14 DIAGNOSIS — M54.16 LUMBAR RADICULOPATHY: ICD-10-CM

## 2017-09-14 DIAGNOSIS — M25.561 PAIN IN BOTH KNEES, UNSPECIFIED CHRONICITY: ICD-10-CM

## 2017-09-14 DIAGNOSIS — G89.29 OTHER CHRONIC PAIN: Primary | ICD-10-CM

## 2017-09-14 PROCEDURE — 99214 OFFICE O/P EST MOD 30 MIN: CPT | Performed by: NURSE PRACTITIONER

## 2017-09-14 RX ORDER — TIZANIDINE 4 MG/1
4 TABLET ORAL EVERY 8 HOURS PRN
Qty: 90 TABLET | Refills: 1 | Status: SHIPPED | OUTPATIENT
Start: 2017-09-14 | End: 2017-09-14 | Stop reason: SDUPTHER

## 2017-09-14 RX ORDER — CYCLOBENZAPRINE HCL 10 MG
TABLET ORAL
Refills: 0 | COMMUNITY
Start: 2017-09-08 | End: 2017-09-14

## 2017-09-14 RX ORDER — GABAPENTIN 300 MG/1
CAPSULE ORAL
Qty: 540 CAPSULE | Refills: 0 | OUTPATIENT
Start: 2017-09-14 | End: 2017-11-20 | Stop reason: SDUPTHER

## 2017-09-14 RX ORDER — GABAPENTIN 300 MG/1
600 CAPSULE ORAL 3 TIMES DAILY
Qty: 180 CAPSULE | Refills: 2 | Status: SHIPPED | OUTPATIENT
Start: 2017-09-14 | End: 2017-09-14 | Stop reason: SDUPTHER

## 2017-09-14 RX ORDER — METHYLPREDNISOLONE 4 MG/1
TABLET ORAL
Refills: 0 | COMMUNITY
Start: 2017-09-08 | End: 2017-09-14

## 2017-09-14 RX ORDER — HYDROCODONE BITARTRATE AND ACETAMINOPHEN 10; 325 MG/1; MG/1
1 TABLET ORAL EVERY 6 HOURS PRN
Qty: 120 TABLET | Refills: 0 | Status: SHIPPED | OUTPATIENT
Start: 2017-09-14 | End: 2017-10-09 | Stop reason: SDUPTHER

## 2017-09-14 RX ORDER — TIZANIDINE 4 MG/1
TABLET ORAL
Qty: 270 TABLET | Refills: 1 | Status: SHIPPED | OUTPATIENT
Start: 2017-09-14 | End: 2018-03-07 | Stop reason: SDUPTHER

## 2017-09-14 NOTE — PROGRESS NOTES
CHIEF COMPLAINT  Patient is here today and states that his back pain has increased. He was seen in the Er at [facility] on 9/8/17 where he was given Flexeril and a steroid pack which did help some. He states he took the last steroid this morning.     Subjective   Artie Cameron is a 52 y.o. male  who presents to the office for follow-up.He has a history of back pain and chronic knee pain/OA. HE states his back pain is worse since last office visit. HE went to ED on 9-8-17 and brought discharge paper. Treated with MDP and Flexeril. He did notice this did improve his pain but noticed some drowsiness.  Has been told he cannot have back surgery.     Complains of pain in his back and knees. Today his pain is 8/10VAS. Describes his pain as continuous and worse(back).  Continues with Hydrocodone 10/325 4/day and gabapentin 300 mg 2 3/day. Denies any side effects from the regimen.  Denies any side effects from the regimen. This helps decrease his pain moderately. ADL's by self but has difficulty due to cardiac status and knee pain.      Knee Pain    Incident onset: chronic. The pain is present in the left knee and right knee. The quality of the pain is described as aching (throbbing). The pain is at a severity of 8/10. The pain is severe. The pain has been constant since onset. Associated symptoms include an inability to bear weight, a loss of motion and numbness. The symptoms are aggravated by movement and weight bearing. He has tried rest (norco) for the symptoms. The treatment provided moderate relief.   Back Pain   This is a new problem. The problem occurs constantly. Progression since onset: reports worse since last office visit-- recent ED visit  9-8-17. The pain is present in the lumbar spine. The quality of the pain is described as aching and burning. The pain is at a severity of 8/10. The pain is moderate. Associated symptoms include numbness and weakness. Pertinent negatives include no abdominal pain, chest pain,  "dysuria, fever or headaches. Risk factors include lack of exercise, obesity and sedentary lifestyle. He has tried analgesics for the symptoms. The treatment provided mild relief.      PEG Assessment   What number best describes your pain on average in the past week?8  What number best describes how, during the past week, pain has interfered with your enjoyment of life?9  What number best describes how, during the past week, pain has interfered with your general activity?  8    The following portions of the patient's history were reviewed and updated as appropriate: allergies, current medications, past family history, past medical history, past social history, past surgical history and problem list.    Review of Systems   Constitutional: Negative for chills and fever.   Respiratory: Negative for shortness of breath.    Cardiovascular: Negative for chest pain.   Gastrointestinal: Negative for abdominal pain, constipation, diarrhea, nausea and vomiting.   Genitourinary: Negative for difficulty urinating and dysuria.   Musculoskeletal: Positive for back pain.   Neurological: Positive for weakness and numbness. Negative for dizziness, light-headedness and headaches.   Psychiatric/Behavioral: Positive for sleep disturbance. Negative for confusion, hallucinations, self-injury and suicidal ideas. The patient is not nervous/anxious.        Vitals:    09/14/17 0758   BP: Comment: pt has heart ware   Pulse: 67   Resp: 18   Temp: 98.3 °F (36.8 °C)   SpO2: 94%   Weight: (!) 330 lb (150 kg)   Height: 69\" (175.3 cm)   PainSc:   8   PainLoc: Back     Objective   Physical Exam   Constitutional: He is oriented to person, place, and time. Vital signs are normal. He appears well-developed and well-nourished. He is cooperative.   HENT:   Head: Normocephalic and atraumatic.   Nose: Nose normal.   Eyes: Conjunctivae and lids are normal.   Cardiovascular:   LVAD patient    Pulmonary/Chest: Effort normal and breath sounds normal. No " respiratory distress.   Abdominal: Normal appearance.   Musculoskeletal:        Right knee: He exhibits decreased range of motion. Tenderness found.        Left knee: He exhibits decreased range of motion. Tenderness found.        Lumbar back: He exhibits tenderness.   Neurological: He is alert and oriented to person, place, and time. Gait (wheelchair) abnormal.   Non-focal   Skin: Skin is warm, dry and intact.   Psychiatric: He has a normal mood and affect. His speech is normal and behavior is normal. Judgment and thought content normal. Cognition and memory are normal.   Nursing note and vitals reviewed.      Assessment/Plan   Artie was seen today for back pain.    Diagnoses and all orders for this visit:    Other chronic pain  -     Oral Fluid Drug Screen; Future    Osteoarthritis of both knees, unspecified osteoarthritis type  -     Oral Fluid Drug Screen; Future    Pain in both knees, unspecified chronicity    DDD (degenerative disc disease), lumbar  -     Oral Fluid Drug Screen; Future    Lumbar radiculopathy    Encounter for long-term (current) use of high-risk medication    Other orders  -     tiZANidine (ZANAFLEX) 4 MG tablet; Take 1 tablet by mouth Every 8 (Eight) Hours As Needed for Muscle Spasms.  -     HYDROcodone-acetaminophen (NORCO)  MG per tablet; Take 1 tablet by mouth Every 6 (Six) Hours As Needed for Moderate Pain .  -     gabapentin (NEURONTIN) 300 MG capsule; Take 2 capsules by mouth 3 (Three) Times a Day.      --- Routine oral drug screen in office today as part of monitoring requirements for controlled substances.  This specimen will be sent to [facility] laboratory for confirmation.     --- Refill Hydrocodone and gabapentin. Patient appears stable with current regimen. No adverse effects. Regarding continuation of opioids, there is no evidence of aberrant behavior or any red flags.  The patient continues with appropriate response to opioid therapy. ADL's remain intact by self.   ---  Trial of tizanidine 4 mg q8hrs PRN. Discussed medication with the patient.  Included in this discussion was the potential for side effects and adverse events.  Patient verbalized understanding and wished to proceed.  Prescription will be sent to pharmacy.  --- Follow-up 2 months or sooner if needed.       NATALIE REPORT    As part of the patient's treatment plan, I am prescribing controlled substances. The patient has been made aware of appropriate use of such medications, including potential risk of somnolence, limited ability to drive and/or work safely, and the potential for dependence or overdose. It has also bee made clear that these medications are for use by this patient only, without concomitant use of alcohol or other substances unless prescribed.     Patient has completed prescribing agreement detailing terms of continued prescribing of controlled substances, including monitoring NATALIE reports, urine drug screening, and pill counts if necessary. The patient is aware that inappropriate use will results in cessation of prescribing such medications.    NATALIE report has been reviewed and scanned into the patient's chart.    Date of last NATALIE : 9-12-17    History and physical exam exhibit continued safe and appropriate use of controlled substances.      EMR Dragon/Transcription disclaimer:   Much of this encounter note is an electronic transcription/translation of spoken language to printed text. The electronic translation of spoken language may permit erroneous, or at times, nonsensical words or phrases to be inadvertently transcribed; Although I have reviewed the note for such errors, some may still exist.

## 2017-10-09 RX ORDER — HYDROCODONE BITARTRATE AND ACETAMINOPHEN 10; 325 MG/1; MG/1
1 TABLET ORAL EVERY 6 HOURS PRN
Qty: 120 TABLET | Refills: 0 | Status: SHIPPED | OUTPATIENT
Start: 2017-10-09 | End: 2017-11-14 | Stop reason: SDUPTHER

## 2017-10-09 NOTE — TELEPHONE ENCOUNTER
Medication Refill Request    Date of phone call: 10/6/17    Medication being requested: Hydrocodone-apap 10 sig: q6hrs  Qty: 120    Date of last visit: 9/14/17    Date of last refill: 9/14/17    NATALIE up to date?: 9/13/17    Next Follow up?: 11/14/17    Any new pertinent information? (i.e, new medication allergies, new use of medications, change in patient's health or condition, non-compliance or inconsistency with prescribing agreement?): n/a

## 2017-11-14 ENCOUNTER — OFFICE VISIT (OUTPATIENT)
Dept: PAIN MEDICINE | Facility: CLINIC | Age: 52
End: 2017-11-14

## 2017-11-14 VITALS
HEIGHT: 69 IN | OXYGEN SATURATION: 94 % | HEART RATE: 60 BPM | RESPIRATION RATE: 16 BRPM | BODY MASS INDEX: 46.65 KG/M2 | TEMPERATURE: 97.4 F | WEIGHT: 315 LBS

## 2017-11-14 DIAGNOSIS — M54.16 LUMBAR RADICULOPATHY: ICD-10-CM

## 2017-11-14 DIAGNOSIS — G89.29 OTHER CHRONIC PAIN: Primary | ICD-10-CM

## 2017-11-14 DIAGNOSIS — M51.36 DDD (DEGENERATIVE DISC DISEASE), LUMBAR: ICD-10-CM

## 2017-11-14 DIAGNOSIS — M25.561 PAIN IN BOTH KNEES, UNSPECIFIED CHRONICITY: ICD-10-CM

## 2017-11-14 DIAGNOSIS — M25.562 PAIN IN BOTH KNEES, UNSPECIFIED CHRONICITY: ICD-10-CM

## 2017-11-14 DIAGNOSIS — Z79.899 ENCOUNTER FOR LONG-TERM (CURRENT) USE OF HIGH-RISK MEDICATION: ICD-10-CM

## 2017-11-14 DIAGNOSIS — M17.0 OSTEOARTHRITIS OF BOTH KNEES, UNSPECIFIED OSTEOARTHRITIS TYPE: ICD-10-CM

## 2017-11-14 PROCEDURE — 99213 OFFICE O/P EST LOW 20 MIN: CPT | Performed by: NURSE PRACTITIONER

## 2017-11-14 RX ORDER — HYDROCODONE BITARTRATE AND ACETAMINOPHEN 10; 325 MG/1; MG/1
1 TABLET ORAL EVERY 6 HOURS PRN
Qty: 120 TABLET | Refills: 0 | Status: SHIPPED | OUTPATIENT
Start: 2017-11-14 | End: 2017-12-11 | Stop reason: SDUPTHER

## 2017-11-14 RX ORDER — ASPIRIN 325 MG
325 TABLET ORAL DAILY
COMMUNITY

## 2017-11-14 NOTE — PROGRESS NOTES
CHIEF COMPLAINT    F/U back and knee pain. States left knee pain has increased.   Pt was recently in hospital due to his heart ware system not reading correctly and increased back and leg pain. He has paper work from [facility] and d/c diagnosis was atrial flutter.    Subjective   Artie Cameron is a 52 y.o. male  who presents to the office for follow-up.He has a history of chronic knee pain/OA(needing TKR but too risky) and chronic low back pain. States left knee pain has increased since last office visit.    Complains of pain in his low back and left knee. Today his pain is 8/10VAS. Describes the pain as continuous and unchanged.   Continues with Hydrocodone 10/325 4/day and gabapentin 300 mg 2 3/day. AT his last office visit, he was changed to Tizanidine 4 mg PRN (rarely).  Denies any side effects from the regimen.  Denies any side effects from the regimen. This helps decrease his pain moderately. ADL's by self but has difficulty due to cardiac status and knee pain.      He recently was in hospital and had to have a cardioversion. Was having atrial flutter.     Knee Pain    Incident onset: chronic. The pain is present in the left knee and right knee. The quality of the pain is described as aching (throbbing). The pain is at a severity of 8/10. The pain is severe. The pain has been constant since onset. Associated symptoms include an inability to bear weight, a loss of motion and numbness. The symptoms are aggravated by movement and weight bearing. He has tried rest (norco) for the symptoms. The treatment provided moderate relief.   Back Pain   This is a new problem. The problem occurs constantly. Progression since onset: reports worse since last office visit-- recent ED visit  9-8-17. The pain is present in the lumbar spine. The quality of the pain is described as aching and burning. The pain is at a severity of 8/10. The pain is moderate. Associated symptoms include numbness and weakness. Pertinent negatives  "include no abdominal pain, chest pain, dysuria, fever or headaches. Risk factors include lack of exercise, obesity and sedentary lifestyle. He has tried analgesics for the symptoms. The treatment provided mild relief.      PEG Assessment   What number best describes your pain on average in the past week?8  What number best describes how, during the past week, pain has interfered with your enjoyment of life?8  What number best describes how, during the past week, pain has interfered with your general activity?  8    The following portions of the patient's history were reviewed and updated as appropriate: allergies, current medications, past family history, past medical history, past social history, past surgical history and problem list.    Review of Systems   Constitutional: Negative for chills and fever.   Respiratory: Negative for cough and shortness of breath.    Cardiovascular: Negative for chest pain.   Gastrointestinal: Negative for abdominal pain, constipation, diarrhea, nausea and vomiting.   Genitourinary: Negative for difficulty urinating and dysuria.   Musculoskeletal: Positive for back pain.   Neurological: Positive for weakness and numbness. Negative for dizziness, light-headedness and headaches.   Psychiatric/Behavioral: Positive for sleep disturbance. Negative for agitation, confusion, hallucinations, self-injury and suicidal ideas. The patient is not nervous/anxious.      Vitals:    11/14/17 0734   BP: Comment: Pt has heart ware   Pulse: 60   Resp: 16   Temp: 97.4 °F (36.3 °C)   SpO2: 94%   Weight: (!) 318 lb (144 kg)   Height: 69\" (175.3 cm)   PainSc:   8   PainLoc: Back  Comment: and knee     Objective   Physical Exam   Constitutional: He is oriented to person, place, and time. Vital signs are normal. He appears well-developed and well-nourished. He is cooperative.   HENT:   Head: Normocephalic and atraumatic.   Nose: Nose normal.   Eyes: Conjunctivae and lids are normal.   Cardiovascular:   LVAD " patient    Pulmonary/Chest: Effort normal and breath sounds normal. No respiratory distress.   Abdominal: Normal appearance.   Musculoskeletal:        Right knee: He exhibits decreased range of motion. Tenderness found.        Left knee: He exhibits decreased range of motion. Tenderness found.        Lumbar back: He exhibits tenderness.   Neurological: He is alert and oriented to person, place, and time. Gait (wheelchair) abnormal.   Non-focal   Skin: Skin is warm, dry and intact.   Psychiatric: He has a normal mood and affect. His speech is normal and behavior is normal. Judgment and thought content normal. Cognition and memory are normal.   Nursing note and vitals reviewed.    Assessment/Plan   Artie was seen today for back pain and pain.    Diagnoses and all orders for this visit:    Other chronic pain    Osteoarthritis of both knees, unspecified osteoarthritis type    Pain in both knees, unspecified chronicity    DDD (degenerative disc disease), lumbar    Lumbar radiculopathy    Encounter for long-term (current) use of high-risk medication    Other orders  -     HYDROcodone-acetaminophen (NORCO)  MG per tablet; Take 1 tablet by mouth Every 6 (Six) Hours As Needed for Moderate Pain .      --- The urine drug screen confirmation from 9-14-17 has been reviewed and the result is appropriate based on patient history and NATALIE report  --- Refill Hydrocodone. Patient appears stable with current regimen. No adverse effects. Regarding continuation of opioids, there is no evidence of aberrant behavior or any red flags.  The patient continues with appropriate response to opioid therapy. ADL's remain intact by self.   --- Follow-up 2 months or sooner if needed with Dr. Tompkins to discuss potential for procedures.        NATALIE REPORT    As part of the patient's treatment plan, I am prescribing controlled substances. The patient has been made aware of appropriate use of such medications, including potential risk of  somnolence, limited ability to drive and/or work safely, and the potential for dependence or overdose. It has also bee made clear that these medications are for use by this patient only, without concomitant use of alcohol or other substances unless prescribed.     Patient has completed prescribing agreement detailing terms of continued prescribing of controlled substances, including monitoring NATALIE reports, urine drug screening, and pill counts if necessary. The patient is aware that inappropriate use will results in cessation of prescribing such medications.    NATALIE report has been reviewed and scanned into the patient's chart.    Date of last NATALIE : 11-13-17    History and physical exam exhibit continued safe and appropriate use of controlled substances.      EMR Dragon/Transcription disclaimer:   Much of this encounter note is an electronic transcription/translation of spoken language to printed text. The electronic translation of spoken language may permit erroneous, or at times, nonsensical words or phrases to be inadvertently transcribed; Although I have reviewed the note for such errors, some may still exist.

## 2017-11-20 RX ORDER — GABAPENTIN 300 MG/1
CAPSULE ORAL
Qty: 180 CAPSULE | Refills: 3 | OUTPATIENT
Start: 2017-11-20 | End: 2018-01-10 | Stop reason: SDUPTHER

## 2017-12-11 RX ORDER — HYDROCODONE BITARTRATE AND ACETAMINOPHEN 10; 325 MG/1; MG/1
1 TABLET ORAL EVERY 6 HOURS PRN
Qty: 120 TABLET | Refills: 0 | Status: SHIPPED | OUTPATIENT
Start: 2017-12-11 | End: 2018-01-10 | Stop reason: SDUPTHER

## 2017-12-11 NOTE — TELEPHONE ENCOUNTER
Medication Refill Request    Date of phone call: 12/7/17    Medication being requested: Hydrocodone-apap 10 sig: q6hrs  Qty: 120    Date of last visit: 11/14/17    Date of last refill: 11/14/17    NATALIE up to date?: 11/13/17    Next Follow up?: 1/10/18    Any new pertinent information? (i.e, new medication allergies, new use of medications, change in patient's health or condition, non-compliance or inconsistency with prescribing agreement?): n/a

## 2018-01-10 ENCOUNTER — OFFICE VISIT (OUTPATIENT)
Dept: PAIN MEDICINE | Facility: CLINIC | Age: 53
End: 2018-01-10

## 2018-01-10 VITALS
TEMPERATURE: 97.3 F | HEIGHT: 69 IN | RESPIRATION RATE: 16 BRPM | WEIGHT: 315 LBS | OXYGEN SATURATION: 95 % | BODY MASS INDEX: 46.65 KG/M2 | HEART RATE: 65 BPM

## 2018-01-10 DIAGNOSIS — M17.0 PRIMARY OSTEOARTHRITIS OF BOTH KNEES: ICD-10-CM

## 2018-01-10 DIAGNOSIS — Z79.899 ENCOUNTER FOR LONG-TERM (CURRENT) USE OF HIGH-RISK MEDICATION: ICD-10-CM

## 2018-01-10 DIAGNOSIS — M54.16 LUMBAR RADICULOPATHY: ICD-10-CM

## 2018-01-10 DIAGNOSIS — M51.36 DDD (DEGENERATIVE DISC DISEASE), LUMBAR: ICD-10-CM

## 2018-01-10 DIAGNOSIS — G89.29 OTHER CHRONIC PAIN: Primary | ICD-10-CM

## 2018-01-10 PROCEDURE — 99214 OFFICE O/P EST MOD 30 MIN: CPT | Performed by: ANESTHESIOLOGY

## 2018-01-10 RX ORDER — GABAPENTIN 300 MG/1
900 CAPSULE ORAL 3 TIMES DAILY
Qty: 270 CAPSULE | Refills: 3 | Status: SHIPPED | OUTPATIENT
Start: 2018-01-10 | End: 2018-03-07 | Stop reason: SDUPTHER

## 2018-01-10 RX ORDER — HYDROCODONE BITARTRATE AND ACETAMINOPHEN 10; 325 MG/1; MG/1
1 TABLET ORAL EVERY 6 HOURS PRN
Qty: 120 TABLET | Refills: 0 | Status: SHIPPED | OUTPATIENT
Start: 2018-01-10 | End: 2018-02-07 | Stop reason: SDUPTHER

## 2018-01-10 RX ORDER — LISINOPRIL 20 MG/1
TABLET ORAL
Refills: 6 | COMMUNITY
Start: 2017-12-18 | End: 2019-01-30

## 2018-01-10 NOTE — PROGRESS NOTES
CHIEF COMPLAINT    F/U back and knee pain. Pt states the pain is worse in his right knee and left thigh. It is a little worse because of the cold weather. States he wishes he could have knee surgery. He just saw Rosa Pelaez at  and office visit notes scanned in. Pt said he can not have injections unless they are at [facility] because he has [facility] Heart ware. He is asking if his Gabapentin can be increased.    Subjective   Artie Cameron is a 52 y.o. male  who presents to the office for follow-up.He has a history of back and knee pain.      Knee Pain    Incident onset: chronic. The pain is present in the left knee and right knee. The quality of the pain is described as aching (throbbing). The pain is severe. The pain has been constant since onset. Associated symptoms include an inability to bear weight, a loss of motion and numbness. The symptoms are aggravated by movement and weight bearing. He has tried rest (norco) for the symptoms. The treatment provided moderate relief.   Back Pain   This is a new problem. The problem occurs constantly. Progression since onset: reports worse since last office visit-- recent ED visit  9-8-17. The pain is present in the lumbar spine. The quality of the pain is described as aching and burning. The pain is moderate. Associated symptoms include numbness and weakness. Pertinent negatives include no abdominal pain, chest pain, dysuria, fever or headaches. Risk factors include lack of exercise, obesity and sedentary lifestyle. He has tried analgesics for the symptoms. The treatment provided mild relief.        PEG Assessment   What number best describes your pain on average in the past week?8  What number best describes how, during the past week, pain has interfered with your enjoyment of life?8  What number best describes how, during the past week, pain has interfered with your general activity?  8      The following portions of the patient's history were reviewed and updated as  "appropriate: allergies, current medications, past family history, past medical history, past social history, past surgical history and problem list.    -------    The following portions of the patient's history were reviewed and updated as appropriate: allergies, current medications, past family history, past medical history, past social history, past surgical history and problem list.    Allergies   Allergen Reactions   • Phenergan [Promethazine Hcl] Shortness Of Breath         Current Outpatient Prescriptions:   •  ACCU-CHEK ABDELRAHMAN PLUS test strip, TEST AC AND QHS, Disp: , Rfl: 5  •  ACCU-CHEK SOFTCLIX LANCETS lancets, USE TO TEST BLOOD SUGAR BEFORE MEALS AND QHS, Disp: , Rfl: 5  •  allopurinol (ZYLOPRIM) 100 MG tablet, TK 1 T PO BID, Disp: , Rfl: 1  •  aspirin 325 MG tablet, Take 325 mg by mouth Daily., Disp: , Rfl:   •  Blood Glucose Monitoring Suppl (ACCU-CHEK ABDELRAHMAN PLUS) W/DEVICE kit, FPD, Disp: , Rfl: 0  •  budesonide (PULMICORT) 0.5 MG/2ML nebulizer solution, Take 0.5 mg by nebulization 2 (Two) Times a Day., Disp: , Rfl:   •  carvedilol (COREG) 25 MG tablet, TK 1 T PO  BID, Disp: , Rfl: 5  •  Cholecalciferol 1000 units tablet dispersible, Take  by mouth., Disp: , Rfl:   •  COLCRYS 0.6 MG tablet, , Disp: , Rfl:   •  FLUOCINOLONE ACETONIDE SCALP 0.01 % oil, APPLY TO SCALP QOD AND QHS, Disp: , Rfl: 11  •  formoterol (PERFOROMIST) 20 MCG/2ML nebulizer solution, Take  by nebulization 2 (Two) Times a Day., Disp: , Rfl:   •  gabapentin (NEURONTIN) 300 MG capsule, Take 3 capsules by mouth 3 (Three) Times a Day., Disp: 270 capsule, Rfl: 3  •  HYDROcodone-acetaminophen (NORCO)  MG per tablet, Take 1 tablet by mouth Every 6 (Six) Hours As Needed for Moderate Pain ., Disp: 120 tablet, Rfl: 0  •  Insulin Detemir (LEVEMIR FLEXPEN SC), Inject  under the skin., Disp: , Rfl:   •  Insulin Syringe 31G X 5/16\" 1 ML misc, USE AS DIRECTED, Disp: , Rfl: 0  •  levothyroxine (SYNTHROID, LEVOTHROID) 50 MCG tablet, TK 1 T PO QD, " "Disp: , Rfl: 6  •  lisinopril (PRINIVIL,ZESTRIL) 10 MG tablet, TK 1 T PO QD, Disp: , Rfl: 5  •  lisinopril (PRINIVIL,ZESTRIL) 20 MG tablet, TK 1 T PO QD, Disp: , Rfl: 6  •  MAGNESIUM-OXIDE 400 (241.3 MG) MG tablet tablet, TK 1 T PO BID, Disp: , Rfl: 5  •  omeprazole (PriLOSEC) 20 MG capsule, TK 1 C PO BID, Disp: , Rfl: 5  •  potassium chloride ER (K-TAB) 20 MEQ tablet controlled-release ER tablet, 20 mEq 2 (Two) Times a Day. TAKE 1 TABLET BY MOUTH DAILY, Disp: , Rfl: 5  •  tiZANidine (ZANAFLEX) 4 MG tablet, TAKE 1 TABLET BY MOUTH EVERY 8 HOURS AS NEEDED FOR MUSCLE SPASMS, Disp: 270 tablet, Rfl: 1  •  torsemide (DEMADEX) 20 MG tablet, 40 mg 2 (Two) Times a Day., Disp: , Rfl: 5  •  warfarin (COUMADIN) 5 MG tablet, TK 1 OR 2 TS PO D UTD. pt states on 4 mg, Disp: , Rfl: 3    Current Outpatient Prescriptions on File Prior to Visit   Medication Sig Dispense Refill   • ACCU-CHEK ABDELRAHMAN PLUS test strip TEST AC AND QHS  5   • ACCU-CHEK SOFTCLIX LANCETS lancets USE TO TEST BLOOD SUGAR BEFORE MEALS AND QHS  5   • allopurinol (ZYLOPRIM) 100 MG tablet TK 1 T PO BID  1   • aspirin 325 MG tablet Take 325 mg by mouth Daily.     • Blood Glucose Monitoring Suppl (ACCU-CHEK ABDELRAHMAN PLUS) W/DEVICE kit FPD  0   • budesonide (PULMICORT) 0.5 MG/2ML nebulizer solution Take 0.5 mg by nebulization 2 (Two) Times a Day.     • carvedilol (COREG) 25 MG tablet TK 1 T PO  BID  5   • Cholecalciferol 1000 units tablet dispersible Take  by mouth.     • COLCRYS 0.6 MG tablet      • FLUOCINOLONE ACETONIDE SCALP 0.01 % oil APPLY TO SCALP QOD AND QHS  11   • formoterol (PERFOROMIST) 20 MCG/2ML nebulizer solution Take  by nebulization 2 (Two) Times a Day.     • Insulin Detemir (LEVEMIR FLEXPEN SC) Inject  under the skin.     • Insulin Syringe 31G X 5/16\" 1 ML misc USE AS DIRECTED  0   • levothyroxine (SYNTHROID, LEVOTHROID) 50 MCG tablet TK 1 T PO QD  6   • lisinopril (PRINIVIL,ZESTRIL) 10 MG tablet TK 1 T PO QD  5   • MAGNESIUM-OXIDE 400 (241.3 MG) MG " tablet tablet TK 1 T PO BID  5   • omeprazole (PriLOSEC) 20 MG capsule TK 1 C PO BID  5   • potassium chloride ER (K-TAB) 20 MEQ tablet controlled-release ER tablet 20 mEq 2 (Two) Times a Day. TAKE 1 TABLET BY MOUTH DAILY  5   • tiZANidine (ZANAFLEX) 4 MG tablet TAKE 1 TABLET BY MOUTH EVERY 8 HOURS AS NEEDED FOR MUSCLE SPASMS 270 tablet 1   • torsemide (DEMADEX) 20 MG tablet 40 mg 2 (Two) Times a Day.  5   • warfarin (COUMADIN) 5 MG tablet TK 1 OR 2 TS PO D UTD. pt states on 4 mg  3   • [DISCONTINUED] gabapentin (NEURONTIN) 300 MG capsule TAKE 2 CAPSULES BY MOUTH THREE TIMES DAILY 180 capsule 3   • [DISCONTINUED] HYDROcodone-acetaminophen (NORCO)  MG per tablet Take 1 tablet by mouth Every 6 (Six) Hours As Needed for Moderate Pain . 120 tablet 0     No current facility-administered medications on file prior to visit.        Patient Active Problem List   Diagnosis   • Chronic pain   • Osteoarthritis of both knees   • History of total knee arthroplasty   • Knee pain   • Encounter for long-term (current) use of high-risk medication   • DDD (degenerative disc disease), lumbar   • Lumbar radiculopathy       Past Medical History:   Diagnosis Date   • Atrial flutter 11/01/2017   • Chronic pain disorder    • Gout    • Joint pain    • Neuropathy in diabetes    • Osteoarthritis        Past Surgical History:   Procedure Laterality Date   • CARDIAC SURGERY     • HIP SURGERY     • REPLACEMENT TOTAL KNEE Left        Family History   Problem Relation Age of Onset   • Heart disease Mother    • Heart disease Father        Social History     Social History   • Marital status: Unknown     Spouse name: N/A   • Number of children: N/A   • Years of education: N/A     Occupational History   • Not on file.     Social History Main Topics   • Smoking status: Former Smoker   • Smokeless tobacco: Not on file   • Alcohol use No   • Drug use: No   • Sexual activity: Not on file     Other Topics Concern   • Not on file     Social History  "Narrative       -------        Review of Systems   Constitutional: Negative for activity change (limited in motorized chair, baseline), chills and fever.   HENT: Positive for ear pain.    Eyes: Negative for pain.   Respiratory: Positive for shortness of breath. Negative for cough.    Cardiovascular: Negative for chest pain.   Gastrointestinal: Negative for abdominal pain, constipation, diarrhea, nausea and vomiting.   Genitourinary: Positive for frequency. Negative for difficulty urinating and dysuria.   Musculoskeletal: Positive for back pain.   Skin: Negative for rash.   Neurological: Positive for weakness and numbness. Negative for dizziness, light-headedness and headaches.   Psychiatric/Behavioral: Positive for sleep disturbance. Negative for agitation, confusion, hallucinations, self-injury and suicidal ideas. The patient is not nervous/anxious.              Vitals:    01/10/18 0811   BP: Comment: pt has heart ware   Pulse: 65   Resp: 16   Temp: 97.3 °F (36.3 °C)   SpO2: 95%   Weight: (!) 144 kg (318 lb)   Height: 175.3 cm (69.02\")   PainSc:   8   PainLoc: Back  Comment: bilaeral knee         Objective   Physical Exam   Constitutional: He is oriented to person, place, and time. Vital signs are normal. He appears well-developed and well-nourished. He is cooperative.   HENT:   Head: Normocephalic and atraumatic.   Eyes: Conjunctivae and lids are normal.   Cardiovascular:   LVAD   Pulmonary/Chest: Effort normal.   Abdominal: Normal appearance.   Musculoskeletal:        Right knee: He exhibits decreased range of motion and swelling. Tenderness found.        Left knee: He exhibits decreased range of motion and swelling. Tenderness found.        Lumbar back: He exhibits tenderness.   Neurological: He is alert and oriented to person, place, and time. Gait (wheelchair) abnormal.   Reflex Scores:       Patellar reflexes are 0 on the right side and 0 on the left side.  Skin: Skin is warm, dry and intact.   Psychiatric: " He has a normal mood and affect. His speech is normal and behavior is normal. Cognition and memory are normal.   Nursing note and vitals reviewed.          Assessment/Plan   Artie was seen today for back pain and pain.    Diagnoses and all orders for this visit:    Other chronic pain    Encounter for long-term (current) use of high-risk medication    Primary osteoarthritis of both knees    Lumbar radiculopathy    DDD (degenerative disc disease), lumbar    Other orders  -     gabapentin (NEURONTIN) 300 MG capsule; Take 3 capsules by mouth 3 (Three) Times a Day.  -     HYDROcodone-acetaminophen (NORCO)  MG per tablet; Take 1 tablet by mouth Every 6 (Six) Hours As Needed for Moderate Pain .        --- Follow-up 2 months    -- increase in gabapentin because of increasing neuropathic pain / radicular pain    -- continue Norco.... Patient appears stable with current regimen. No adverse effects. Regarding continuation of opioids, there is no evidence of aberrant behavior or any red flags.  The patient continues with appropriate response to opioid therapy. ADL's remain intact by self.                  NATALIE REPORT    As part of the patient's treatment plan, I am prescribing controlled substances. The patient has been made aware of appropriate use of such medications, including potential risk of somnolence, limited ability to drive and/or work safely, and the potential for dependence or overdose. It has also bee made clear that these medications are for use by this patient only, without concomitant use of alcohol or other substances unless prescribed.     Patient has completed prescribing agreement detailing terms of continued prescribing of controlled substances, including monitoring NATALIE reports, urine drug screening, and pill counts if necessary. The patient is aware that inappropriate use will results in cessation of prescribing such medications.    NATALIE report has been reviewed and scanned into the patient's  chart.    Date of last NATALIE : as above    History and physical exam exhibit continued safe and appropriate use of controlled substances.      EMR Dragon/Transcription disclaimer:   Much of this encounter note is an electronic transcription/translation of spoken language to printed text. The electronic translation of spoken language may permit erroneous, or at times, nonsensical words or phrases to be inadvertently transcribed; Although I have reviewed the note for such errors, some may still exist.

## 2018-02-07 RX ORDER — HYDROCODONE BITARTRATE AND ACETAMINOPHEN 10; 325 MG/1; MG/1
1 TABLET ORAL EVERY 6 HOURS PRN
Qty: 120 TABLET | Refills: 0 | Status: SHIPPED | OUTPATIENT
Start: 2018-02-07 | End: 2018-03-07 | Stop reason: SDUPTHER

## 2018-02-07 NOTE — TELEPHONE ENCOUNTER
Medication Refill Request    Date of phone call: 2/6/18    Medication being requested: Hydrocodone-apap 10 sig: q6hrs  Qty: 120    Date of last visit: 1/10/18    Date of last refill: 1/10/18    NATALIE up to date?: 1/8/18    Next Follow up?: 3/7/18    Any new pertinent information? (i.e, new medication allergies, new use of medications, change in patient's health or condition, non-compliance or inconsistency with prescribing agreement?): n.a

## 2018-03-07 ENCOUNTER — OFFICE VISIT (OUTPATIENT)
Dept: PAIN MEDICINE | Facility: CLINIC | Age: 53
End: 2018-03-07

## 2018-03-07 VITALS
RESPIRATION RATE: 16 BRPM | HEART RATE: 87 BPM | WEIGHT: 315 LBS | HEIGHT: 69 IN | OXYGEN SATURATION: 95 % | TEMPERATURE: 97.4 F | BODY MASS INDEX: 46.65 KG/M2

## 2018-03-07 DIAGNOSIS — Z79.899 ENCOUNTER FOR LONG-TERM (CURRENT) USE OF HIGH-RISK MEDICATION: ICD-10-CM

## 2018-03-07 DIAGNOSIS — G89.29 OTHER CHRONIC PAIN: Primary | ICD-10-CM

## 2018-03-07 DIAGNOSIS — M17.0 OSTEOARTHRITIS OF BOTH KNEES, UNSPECIFIED OSTEOARTHRITIS TYPE: ICD-10-CM

## 2018-03-07 DIAGNOSIS — M51.36 DDD (DEGENERATIVE DISC DISEASE), LUMBAR: ICD-10-CM

## 2018-03-07 DIAGNOSIS — M54.16 LUMBAR RADICULOPATHY: ICD-10-CM

## 2018-03-07 PROCEDURE — 99213 OFFICE O/P EST LOW 20 MIN: CPT | Performed by: ANESTHESIOLOGY

## 2018-03-07 RX ORDER — TIZANIDINE 4 MG/1
4 TABLET ORAL EVERY 8 HOURS PRN
Qty: 270 TABLET | Refills: 0 | Status: SHIPPED | OUTPATIENT
Start: 2018-03-07 | End: 2018-05-31 | Stop reason: SDUPTHER

## 2018-03-07 RX ORDER — GABAPENTIN 300 MG/1
900 CAPSULE ORAL 3 TIMES DAILY
Qty: 270 CAPSULE | Refills: 0 | Status: SHIPPED | OUTPATIENT
Start: 2018-03-07 | End: 2018-05-03 | Stop reason: DRUGHIGH

## 2018-03-07 RX ORDER — HYDROCODONE BITARTRATE AND ACETAMINOPHEN 10; 325 MG/1; MG/1
1 TABLET ORAL EVERY 6 HOURS PRN
Qty: 120 TABLET | Refills: 0 | Status: SHIPPED | OUTPATIENT
Start: 2018-03-07 | End: 2018-04-05 | Stop reason: SDUPTHER

## 2018-03-07 NOTE — PROGRESS NOTES
"CHIEF COMPLAINT    F/U back and bilateral knee pain. Pt states he is still trying to get more weight off before having knee surgery. His Gabapentin has been helping his nerve pain. Back pain is \"off and on\" and still takes muscle relaxer off and on.     Subjective   Artie Cameron is a 53 y.o. male  who presents to the office for follow-up.He has a history of knee and back pain.  He had left knee replacement in 1999.  He hopes to have a right knee replacement this year.      Knee Pain    Incident onset: chronic. There was no injury mechanism. The pain is present in the left knee and right knee. The quality of the pain is described as aching (throbbing). The pain is severe. The pain has been intermittent since onset. Associated symptoms include an inability to bear weight, a loss of motion and numbness. The symptoms are aggravated by movement and weight bearing. He has tried rest (norco) for the symptoms. The treatment provided moderate relief.   Back Pain   This is a new problem. The problem occurs intermittently. The problem is unchanged. The pain is present in the lumbar spine. The quality of the pain is described as aching and burning. The pain is moderate. Associated symptoms include numbness and weakness. Pertinent negatives include no abdominal pain, chest pain, dysuria, fever or headaches. Risk factors include lack of exercise, obesity and sedentary lifestyle. He has tried analgesics and heat (antineuropathics) for the symptoms. The treatment provided moderate relief.        PEG Assessment   What number best describes your pain on average in the past week?8  What number best describes how, during the past week, pain has interfered with your enjoyment of life?8  What number best describes how, during the past week, pain has interfered with your general activity?  8      The following portions of the patient's history were reviewed and updated as appropriate: allergies, current medications, past family history, " "past medical history, past social history, past surgical history and problem list.    Current Outpatient Prescriptions on File Prior to Visit   Medication Sig Dispense Refill   • ACCU-CHEK ABDELRAHMAN PLUS test strip TEST AC AND QHS  5   • ACCU-CHEK SOFTCLIX LANCETS lancets USE TO TEST BLOOD SUGAR BEFORE MEALS AND QHS  5   • allopurinol (ZYLOPRIM) 100 MG tablet TK 1 T PO BID  1   • aspirin 325 MG tablet Take 325 mg by mouth Daily.     • Blood Glucose Monitoring Suppl (ACCU-CHEK ABDELRAHMAN PLUS) W/DEVICE kit FPD  0   • budesonide (PULMICORT) 0.5 MG/2ML nebulizer solution Take 0.5 mg by nebulization 2 (Two) Times a Day.     • carvedilol (COREG) 25 MG tablet TK 1 T PO  BID  5   • Cholecalciferol 1000 units tablet dispersible Take  by mouth.     • COLCRYS 0.6 MG tablet      • FLUOCINOLONE ACETONIDE SCALP 0.01 % oil APPLY TO SCALP QOD AND QHS  11   • formoterol (PERFOROMIST) 20 MCG/2ML nebulizer solution Take  by nebulization 2 (Two) Times a Day.     • Insulin Detemir (LEVEMIR FLEXPEN SC) Inject  under the skin.     • Insulin Syringe 31G X 5/16\" 1 ML misc USE AS DIRECTED  0   • levothyroxine (SYNTHROID, LEVOTHROID) 50 MCG tablet TK 1 T PO QD  6   • lisinopril (PRINIVIL,ZESTRIL) 10 MG tablet TK 1 T PO QD  5   • lisinopril (PRINIVIL,ZESTRIL) 20 MG tablet TK 1 T PO QD  6   • MAGNESIUM-OXIDE 400 (241.3 MG) MG tablet tablet TK 1 T PO BID  5   • omeprazole (PriLOSEC) 20 MG capsule TK 1 C PO BID  5   • potassium chloride ER (K-TAB) 20 MEQ tablet controlled-release ER tablet 20 mEq 2 (Two) Times a Day. TAKE 1 TABLET BY MOUTH DAILY  5   • torsemide (DEMADEX) 20 MG tablet 40 mg 2 (Two) Times a Day.  5   • warfarin (COUMADIN) 5 MG tablet TK 1 OR 2 TS PO D UTD. pt states on 4 mg  3   • [DISCONTINUED] gabapentin (NEURONTIN) 300 MG capsule Take 3 capsules by mouth 3 (Three) Times a Day. 270 capsule 3   • [DISCONTINUED] HYDROcodone-acetaminophen (NORCO)  MG per tablet Take 1 tablet by mouth Every 6 (Six) Hours As Needed for Moderate Pain . " "120 tablet 0   • [DISCONTINUED] tiZANidine (ZANAFLEX) 4 MG tablet TAKE 1 TABLET BY MOUTH EVERY 8 HOURS AS NEEDED FOR MUSCLE SPASMS 270 tablet 1     No current facility-administered medications on file prior to visit.          Review of Systems   Constitutional: Negative for activity change (limited in motorized chair, baseline), chills and fever.   HENT: Positive for ear pain.    Eyes: Negative for pain.   Respiratory: Positive for shortness of breath. Negative for cough.    Cardiovascular: Negative for chest pain.   Gastrointestinal: Negative for abdominal pain, constipation, diarrhea, nausea and vomiting.   Genitourinary: Positive for frequency. Negative for difficulty urinating and dysuria.   Musculoskeletal: Positive for back pain.   Skin: Negative for rash.   Neurological: Positive for weakness and numbness. Negative for dizziness, light-headedness and headaches.   Psychiatric/Behavioral: Positive for sleep disturbance. Negative for agitation, confusion, hallucinations, self-injury and suicidal ideas. The patient is not nervous/anxious.              Vitals:    03/07/18 0753   BP: Comment: pt has heart ware   Pulse: 87   Resp: 16   Temp: 97.4 °F (36.3 °C)   SpO2: 95%   Weight: (!) 143 kg (315 lb)  Comment: pt states this was his weight when last at [facility]   Height: 175.3 cm (69.02\")         Objective   Physical Exam   Constitutional: He is oriented to person, place, and time. Vital signs are normal. He appears well-developed and well-nourished. He is cooperative.   HENT:   Head: Normocephalic and atraumatic.   Eyes: Conjunctivae and lids are normal. Pupils are equal, round, and reactive to light. No scleral icterus.   Cardiovascular:   LVAD   Pulmonary/Chest: Effort normal.   Abdominal: Normal appearance.   Musculoskeletal:        Right knee: He exhibits decreased range of motion and swelling. Tenderness found.        Left knee: He exhibits decreased range of motion and swelling. Tenderness found.        Lumbar " back: He exhibits tenderness.   Neurological: He is alert and oriented to person, place, and time. Gait (wheelchair) abnormal.   Reflex Scores:       Patellar reflexes are 0 on the right side and 0 on the left side.       Achilles reflexes are 0 on the right side and 0 on the left side.  Skin: Skin is warm, dry and intact.   Psychiatric: He has a normal mood and affect. His speech is normal and behavior is normal. Cognition and memory are normal.   Nursing note and vitals reviewed.          Assessment/Plan   Artie was seen today for back pain and knee pain.    Diagnoses and all orders for this visit:    Other chronic pain    Encounter for long-term (current) use of high-risk medication    Osteoarthritis of both knees, unspecified osteoarthritis type    Lumbar radiculopathy    DDD (degenerative disc disease), lumbar    Other orders  -     HYDROcodone-acetaminophen (NORCO)  MG per tablet; Take 1 tablet by mouth Every 6 (Six) Hours As Needed for Moderate Pain .  -     gabapentin (NEURONTIN) 300 MG capsule; Take 3 capsules by mouth 3 (Three) Times a Day.  -     tiZANidine (ZANAFLEX) 4 MG tablet; Take 1 tablet by mouth Every 8 (Eight) Hours As Needed for Muscle Spasms.        --- Follow-up 2 month with [facility]  -- he hopes to have right knee replacement sometime soon               NATALIE REPORT    As part of the patient's treatment plan, I am prescribing controlled substances. The patient has been made aware of appropriate use of such medications, including potential risk of somnolence, limited ability to drive and/or work safely, and the potential for dependence or overdose. It has also bee made clear that these medications are for use by this patient only, without concomitant use of alcohol or other substances unless prescribed.     Patient has completed prescribing agreement detailing terms of continued prescribing of controlled substances, including monitoring NATALIE reports, urine drug screening, and pill counts  if necessary. The patient is aware that inappropriate use will results in cessation of prescribing such medications.    NATALIE report has been reviewed and scanned into the patient's chart.    Date of last NATALIE : as above    History and physical exam exhibit continued safe and appropriate use of controlled substances.      EMR Dragon/Transcription disclaimer:   Much of this encounter note is an electronic transcription/translation of spoken language to printed text. The electronic translation of spoken language may permit erroneous, or at times, nonsensical words or phrases to be inadvertently transcribed; Although I have reviewed the note for such errors, some may still exist.

## 2018-04-05 RX ORDER — HYDROCODONE BITARTRATE AND ACETAMINOPHEN 10; 325 MG/1; MG/1
1 TABLET ORAL EVERY 6 HOURS PRN
Qty: 120 TABLET | Refills: 0 | Status: SHIPPED | OUTPATIENT
Start: 2018-04-05 | End: 2018-05-03 | Stop reason: SDUPTHER

## 2018-04-05 NOTE — TELEPHONE ENCOUNTER
Medication Refill Request    Date of phone call: 4-5-18    Medication being requested: Hydrocodone-apap  mg sig: Take 1 tablet by mouth every 6 hours as needed for moderate pain  Qty: 120 tablets    Date of last visit: 3-07-18    Date of last refill: 3-07-18    NATALIE up to date?: yes, 3-6-18    Next Follow up?: 5-03-18    Any new pertinent information? (i.e, new medication allergies, new use of medications, change in patient's health or condition, non-compliance or inconsistency with prescribing agreement?): n/a

## 2018-05-03 ENCOUNTER — RESULTS ENCOUNTER (OUTPATIENT)
Dept: PAIN MEDICINE | Facility: CLINIC | Age: 53
End: 2018-05-03

## 2018-05-03 ENCOUNTER — OFFICE VISIT (OUTPATIENT)
Dept: PAIN MEDICINE | Facility: CLINIC | Age: 53
End: 2018-05-03

## 2018-05-03 VITALS
WEIGHT: 315 LBS | RESPIRATION RATE: 18 BRPM | OXYGEN SATURATION: 96 % | HEIGHT: 69 IN | HEART RATE: 73 BPM | BODY MASS INDEX: 46.65 KG/M2 | TEMPERATURE: 97.8 F

## 2018-05-03 DIAGNOSIS — G89.29 OTHER CHRONIC PAIN: ICD-10-CM

## 2018-05-03 DIAGNOSIS — M25.562 PAIN IN BOTH KNEES, UNSPECIFIED CHRONICITY: ICD-10-CM

## 2018-05-03 DIAGNOSIS — Z79.899 ENCOUNTER FOR LONG-TERM (CURRENT) USE OF HIGH-RISK MEDICATION: ICD-10-CM

## 2018-05-03 DIAGNOSIS — M17.0 OSTEOARTHRITIS OF BOTH KNEES, UNSPECIFIED OSTEOARTHRITIS TYPE: ICD-10-CM

## 2018-05-03 DIAGNOSIS — G89.29 OTHER CHRONIC PAIN: Primary | ICD-10-CM

## 2018-05-03 DIAGNOSIS — M25.561 PAIN IN BOTH KNEES, UNSPECIFIED CHRONICITY: ICD-10-CM

## 2018-05-03 DIAGNOSIS — M51.36 DDD (DEGENERATIVE DISC DISEASE), LUMBAR: ICD-10-CM

## 2018-05-03 PROCEDURE — 99214 OFFICE O/P EST MOD 30 MIN: CPT | Performed by: NURSE PRACTITIONER

## 2018-05-03 RX ORDER — HYDROCODONE BITARTRATE AND ACETAMINOPHEN 10; 325 MG/1; MG/1
1 TABLET ORAL EVERY 6 HOURS PRN
Qty: 120 TABLET | Refills: 0 | Status: SHIPPED | OUTPATIENT
Start: 2018-05-03 | End: 2018-06-06 | Stop reason: SDUPTHER

## 2018-05-03 RX ORDER — GABAPENTIN 800 MG/1
800 TABLET ORAL 4 TIMES DAILY
Qty: 120 TABLET | Refills: 3 | Status: SHIPPED | OUTPATIENT
Start: 2018-05-03 | End: 2018-09-04 | Stop reason: DRUGHIGH

## 2018-05-03 NOTE — PROGRESS NOTES
"CHIEF COMPLAINT  Back and bilateral knee pain has increased since last visit. He states it is because of the weather \"it's going to rain\"    Subjective   Artie Cameron is a 53 y.o. male  who presents to the office for follow-up.He has a history of chronic back and knee pain. Reports his pain is worse since last office visit, but relates this is worse due to weather changes.    Complains of pain in his knee and back. Today his pain is 8/10VAS. Describes the pain as continuous and worsening.  Continues with Hydrocodone 10/325 4/day and gabapentin 300 mg 3 3/day and tizanidine 4 mg PRN.  Denies any side effects from the regimen.  He states the Tizanidine does \"mess with my INR.\" This helps decrease his pain moderately. ADL's by self but has difficulty due to cardiac status and knee pain.      Is wanting a right TKR. Has to lose weight before surgery.  Considering bariatric surgery. Has to get clearance from cardiac specialists.     Has been clean from smoking and drinking for 8 years.   Knee Pain    Incident onset: chronic. There was no injury mechanism. The pain is present in the left knee and right knee. The quality of the pain is described as aching (throbbing). The pain is at a severity of 8/10. The pain is severe. The pain has been intermittent since onset. Associated symptoms include an inability to bear weight and a loss of motion. Pertinent negatives include no numbness. The symptoms are aggravated by movement and weight bearing. He has tried rest (norco) for the symptoms. The treatment provided moderate relief.   Back Pain   This is a new problem. The problem occurs intermittently. The problem is unchanged. The pain is present in the lumbar spine. The quality of the pain is described as aching and burning. The pain is at a severity of 8/10. The pain is moderate. Associated symptoms include weakness (knee's). Pertinent negatives include no abdominal pain, chest pain, dysuria, fever, headaches or numbness. Risk " "factors include lack of exercise, obesity and sedentary lifestyle. He has tried analgesics and heat (antineuropathics) for the symptoms. The treatment provided moderate relief.      PEG Assessment   What number best describes your pain on average in the past week?8  What number best describes how, during the past week, pain has interfered with your enjoyment of life?7  What number best describes how, during the past week, pain has interfered with your general activity?  8    The following portions of the patient's history were reviewed and updated as appropriate: allergies, current medications, past family history, past medical history, past social history, past surgical history and problem list.    Review of Systems   Constitutional: Negative for chills and fever.   Respiratory: Negative for shortness of breath.    Cardiovascular: Negative for chest pain.   Gastrointestinal: Negative for abdominal pain, constipation, diarrhea, nausea and vomiting.   Genitourinary: Negative for difficulty urinating, dysuria and enuresis.   Musculoskeletal: Positive for back pain.        Knee pain   Neurological: Positive for weakness (knee's). Negative for dizziness, light-headedness, numbness and headaches.   Psychiatric/Behavioral: Negative for confusion, hallucinations, self-injury, sleep disturbance and suicidal ideas. The patient is not nervous/anxious.        Vitals:    05/03/18 0749   BP: Comment: pt has heart ware   Pulse: 73   Resp: 18   Temp: 97.8 °F (36.6 °C)   SpO2: 96%   Weight: (!) 150 kg (330 lb)   Height: 175.3 cm (69\")   PainSc:   8   PainLoc: Back     Objective   Physical Exam   Constitutional: He is oriented to person, place, and time. Vital signs are normal. He appears well-developed and well-nourished. He is cooperative.   HENT:   Head: Normocephalic and atraumatic.   Eyes: Conjunctivae and lids are normal.   Cardiovascular:   LVAD   Pulmonary/Chest: Effort normal.   Abdominal: Normal appearance. "   Musculoskeletal:        Right knee: He exhibits decreased range of motion and swelling. Tenderness found.        Left knee: He exhibits decreased range of motion and swelling. Tenderness found.        Lumbar back: He exhibits tenderness.   Surgical scar of left knee   Neurological: He is alert and oriented to person, place, and time. Gait (wheelchair) abnormal.   Reflex Scores:       Patellar reflexes are 0 on the right side and 0 on the left side.  Skin: Skin is warm, dry and intact.   Psychiatric: He has a normal mood and affect. His speech is normal and behavior is normal. Cognition and memory are normal.   Nursing note and vitals reviewed.        Assessment/Plan   Artie was seen today for back pain and knee pain.    Diagnoses and all orders for this visit:    Other chronic pain  -     Oral Fluid Drug Screen - Saliva, Oral Cavity; Future    Pain in both knees, unspecified chronicity  -     Oral Fluid Drug Screen - Saliva, Oral Cavity; Future    Osteoarthritis of both knees, unspecified osteoarthritis type  -     Oral Fluid Drug Screen - Saliva, Oral Cavity; Future    DDD (degenerative disc disease), lumbar  -     Oral Fluid Drug Screen - Saliva, Oral Cavity; Future    Encounter for long-term (current) use of high-risk medication    Other orders  -     HYDROcodone-acetaminophen (NORCO)  MG per tablet; Take 1 tablet by mouth Every 6 (Six) Hours As Needed for Moderate Pain  (DNF until 5-10-18).  -     gabapentin (NEURONTIN) 800 MG tablet; Take 1 tablet by mouth 4 (Four) Times a Day.      --- Routine oral drug screen in office today as part of monitoring requirements for controlled substances.   This specimen will be sent to [facility] for confirmation.     --- Refill Hydrocodone DNF until 5-10-18. Patient appears stable with current regimen. No adverse effects. Regarding continuation of opioids, there is no evidence of aberrant behavior or any red flags.  The patient continues with appropriate  response to opioid therapy. ADL's remain intact by self.   --- Change Gabapentin 800 mg QID. Discussed medication with the patient.  Included in this discussion was the potential for side effects and adverse events.  Patient verbalized understanding and wished to proceed.  Prescription will be sent to pharmacy.  --- Follow-up 2 months or sooner if needed.       NATALIE REPORT    As part of the patient's treatment plan, I am prescribing controlled substances. The patient has been made aware of appropriate use of such medications, including potential risk of somnolence, limited ability to drive and/or work safely, and the potential for dependence or overdose. It has also bee made clear that these medications are for use by this patient only, without concomitant use of alcohol or other substances unless prescribed.     Patient has completed prescribing agreement detailing terms of continued prescribing of controlled substances, including monitoring NATALIE reports, urine drug screening, and pill counts if necessary. The patient is aware that inappropriate use will results in cessation of prescribing such medications.    NATALIE report has been reviewed and scanned into the patient's chart.    Date of last NATALIE : 5-2-18    History and physical exam exhibit continued safe and appropriate use of controlled substances.      EMR Dragon/Transcription disclaimer:   Much of this encounter note is an electronic transcription/translation of spoken language to printed text. The electronic translation of spoken language may permit erroneous, or at times, nonsensical words or phrases to be inadvertently transcribed; Although I have reviewed the note for such errors, some may still exist.

## 2018-06-01 RX ORDER — TIZANIDINE 4 MG/1
TABLET ORAL
Qty: 270 TABLET | Refills: 0 | Status: SHIPPED | OUTPATIENT
Start: 2018-06-01 | End: 2018-08-30 | Stop reason: SDUPTHER

## 2018-06-06 RX ORDER — HYDROCODONE BITARTRATE AND ACETAMINOPHEN 10; 325 MG/1; MG/1
1 TABLET ORAL EVERY 6 HOURS PRN
Qty: 120 TABLET | Refills: 0 | Status: SHIPPED | OUTPATIENT
Start: 2018-06-06 | End: 2018-07-03 | Stop reason: SDUPTHER

## 2018-06-06 NOTE — TELEPHONE ENCOUNTER
Medication Refill Request    Date of phone call: 6-6-18    Medication being requested: Hydrocodone-apap  mg sig: Take 1 tablet by mouth every 6 hours PRN  Qty: 120 tablets    Date of last visit: 5-03-18    Date of last refill: 5-03-18    NATALIE up to date?: 5-03-18    Next Follow up?: 7-03-18    Any new pertinent information? (i.e, new medication allergies, new use of medications, change in patient's health or condition, non-compliance or inconsistency with prescribing agreement?): n/a

## 2018-07-03 ENCOUNTER — OFFICE VISIT (OUTPATIENT)
Dept: PAIN MEDICINE | Facility: CLINIC | Age: 53
End: 2018-07-03

## 2018-07-03 VITALS
RESPIRATION RATE: 15 BRPM | OXYGEN SATURATION: 96 % | HEART RATE: 66 BPM | WEIGHT: 315 LBS | TEMPERATURE: 96.3 F | HEIGHT: 69 IN | BODY MASS INDEX: 46.65 KG/M2

## 2018-07-03 DIAGNOSIS — M51.36 DDD (DEGENERATIVE DISC DISEASE), LUMBAR: ICD-10-CM

## 2018-07-03 DIAGNOSIS — M17.0 OSTEOARTHRITIS OF BOTH KNEES, UNSPECIFIED OSTEOARTHRITIS TYPE: ICD-10-CM

## 2018-07-03 DIAGNOSIS — G89.29 OTHER CHRONIC PAIN: Primary | ICD-10-CM

## 2018-07-03 DIAGNOSIS — Z79.899 ENCOUNTER FOR LONG-TERM (CURRENT) USE OF HIGH-RISK MEDICATION: ICD-10-CM

## 2018-07-03 DIAGNOSIS — M54.16 LUMBAR RADICULOPATHY: ICD-10-CM

## 2018-07-03 PROCEDURE — 99214 OFFICE O/P EST MOD 30 MIN: CPT | Performed by: NURSE PRACTITIONER

## 2018-07-03 RX ORDER — HYDROCODONE BITARTRATE AND ACETAMINOPHEN 10; 325 MG/1; MG/1
1 TABLET ORAL EVERY 6 HOURS PRN
Qty: 120 TABLET | Refills: 0 | Status: SHIPPED | OUTPATIENT
Start: 2018-07-03 | End: 2018-08-06 | Stop reason: SDUPTHER

## 2018-07-03 NOTE — PROGRESS NOTES
CHIEF COMPLAINT  F/U back and bilateral knee pain. States pain is unchanged and he is alright as long as he takes his medication.     Subjective   Artie Cameron is a 53 y.o. male  who presents to the office for follow-up.He has a history of chronic back and knee pain due to OA. Reports his pattern is unchanged since last office visit.    Complains of pain in his back and knee.  Today his pain is 8/10VAS. Describes the pain as continuous and unchanged. Continues with Hydrocodone 10/325 4/day, gabapentin 300 mg 4/day(had to stop 800 mg due to kidney issues) and Tizanidine 4 mg PRN. Denies any side effects from the regimen.  The regimen helps decrease his pain moderately. ADL's by self but difficulty due to cardiac status.   Has to regularly get INR checked.  He believes the Tizanidine can interfere with INR.   Has a history of alcoholism. Has been sober over 8 years.   Is wanting a right TKR. Has to lose weight before surgery.  Considering bariatric surgery. Has to get clearance from cardiac specialists. This is still pending.   He had an echo last month. Reports his ejection fraction is 12% which is unchanged.   Knee Pain    Incident onset: chronic. Injury mechanism: chronic. The pain is present in the left knee and right knee. The quality of the pain is described as aching. The pain is at a severity of 8/10. The pain is severe. The pain has been worsening since onset. Associated symptoms include an inability to bear weight and a loss of motion. Pertinent negatives include no numbness. The symptoms are aggravated by movement and weight bearing. He has tried rest (norco) for the symptoms. The treatment provided moderate relief.   Back Pain   This is a new problem. The problem occurs intermittently. The problem has been gradually worsening (unchanged from last office visit) since onset. The pain is present in the lumbar spine. The quality of the pain is described as aching and burning. The pain is at a severity of 8/10.  "The pain is moderate. Associated symptoms include weakness (knee's). Pertinent negatives include no abdominal pain, bladder incontinence, bowel incontinence, chest pain, dysuria, fever, headaches or numbness. Risk factors include lack of exercise, obesity and sedentary lifestyle. He has tried analgesics and heat (gabapentin) for the symptoms. The treatment provided moderate relief.      PEG Assessment   What number best describes your pain on average in the past week?7  What number best describes how, during the past week, pain has interfered with your enjoyment of life?7  What number best describes how, during the past week, pain has interfered with your general activity?  8    The following portions of the patient's history were reviewed and updated as appropriate: allergies, current medications, past family history, past medical history, past social history, past surgical history and problem list.    Review of Systems   Constitutional: Positive for activity change (pt in motorized wheelchair, baseline). Negative for chills and fever.   Respiratory: Negative for shortness of breath.    Cardiovascular: Negative for chest pain.   Gastrointestinal: Negative for abdominal pain, bowel incontinence, constipation, diarrhea, nausea and vomiting.   Genitourinary: Negative for bladder incontinence, difficulty urinating, dysuria and enuresis.   Musculoskeletal: Positive for back pain.        Knee pain   Neurological: Positive for weakness (knee's). Negative for dizziness, light-headedness, numbness and headaches.   Psychiatric/Behavioral: Negative for agitation, confusion, hallucinations, self-injury, sleep disturbance and suicidal ideas. The patient is not nervous/anxious.        Vitals:    07/03/18 0744   BP: Comment: pt has heart ware   Pulse: 66   Resp: 15   Temp: 96.3 °F (35.7 °C)   SpO2: 96%   Weight: (!) 150 kg (330 lb)   Height: 175.3 cm (69.02\")   PainSc:   8   PainLoc: Back  Comment: and knees     Objective "   Physical Exam   Constitutional: He is oriented to person, place, and time. Vital signs are normal. He appears well-developed and well-nourished. He is cooperative.   HENT:   Head: Normocephalic and atraumatic.   Eyes: Conjunctivae and lids are normal.   Cardiovascular:   LVAD   Pulmonary/Chest: Effort normal.   Abdominal: Normal appearance.   Musculoskeletal:        Right knee: He exhibits decreased range of motion and swelling. Tenderness found.        Left knee: He exhibits decreased range of motion and swelling. Tenderness found.        Lumbar back: He exhibits decreased range of motion and tenderness.   Surgical scar of left knee   Neurological: He is alert and oriented to person, place, and time. Gait (motorized wheelchair) abnormal.   Reflex Scores:       Patellar reflexes are 0 on the right side and 0 on the left side.  Skin: Skin is warm, dry and intact.   Psychiatric: He has a normal mood and affect. His speech is normal and behavior is normal. Cognition and memory are normal.   Nursing note and vitals reviewed.      Assessment/Plan   Artie was seen today for back pain and knee pain.    Diagnoses and all orders for this visit:    Other chronic pain    DDD (degenerative disc disease), lumbar    Osteoarthritis of both knees, unspecified osteoarthritis type    Lumbar radiculopathy    Encounter for long-term (current) use of high-risk medication    Other orders  -     HYDROcodone-acetaminophen (NORCO)  MG per tablet; Take 1 tablet by mouth Every 6 (Six) Hours As Needed for Moderate Pain  (DNF 7-9-18).      --- The oral drug screen confirmation from 5-3-18 has been reviewed and the result is APROPRIATE based on patient history and NATALIE report  --- refill Hydrocodone DNF 7-9-18. Patient appears stable with current regimen. No adverse effects. Regarding continuation of opioids, there is no evidence of aberrant behavior or any red flags.  The patient continues with appropriate response to opioid therapy.  ADL's remain intact by self.   --- Continue with other specialists as planned.  --- Change Gabapentin 800 mg to 300 mg due to kidney dysfunction.   --- Follow-up 2 months or sooner if needed.       NATALIE REPORT    As part of the patient's treatment plan, I am prescribing controlled substances. The patient has been made aware of appropriate use of such medications, including potential risk of somnolence, limited ability to drive and/or work safely, and the potential for dependence or overdose. It has also bee made clear that these medications are for use by this patient only, without concomitant use of alcohol or other substances unless prescribed.     Patient has completed prescribing agreement detailing terms of continued prescribing of controlled substances, including monitoring NATALIE reports, urine drug screening, and pill counts if necessary. The patient is aware that inappropriate use will results in cessation of prescribing such medications.    NATALIE report has been reviewed and scanned into the patient's chart.    As the clinician, I personally reviewed the NATALIE from 7-2-18 while the patient was in the office today.    History and physical exam exhibit continued safe and appropriate use of controlled substances.      EMR Dragon/Transcription disclaimer:   Much of this encounter note is an electronic transcription/translation of spoken language to printed text. The electronic translation of spoken language may permit erroneous, or at times, nonsensical words or phrases to be inadvertently transcribed; Although I have reviewed the note for such errors, some may still exist.

## 2018-08-06 NOTE — TELEPHONE ENCOUNTER
Medication Refill Request    Date of phone call: 18    Medication being requested: hydro/apap  mg si tab q 6 hrs  Qty: 120    Date of last visit: 7/3/18    Date of last refill: 18    NATALIE up to date?: yes    Next Follow up?: 18    Any new pertinent information? (i.e, new medication allergies, new use of medications, change in patient's health or condition, non-compliance or inconsistency with prescribing agreement?):

## 2018-08-07 RX ORDER — HYDROCODONE BITARTRATE AND ACETAMINOPHEN 10; 325 MG/1; MG/1
1 TABLET ORAL EVERY 6 HOURS PRN
Qty: 120 TABLET | Refills: 0 | Status: SHIPPED | OUTPATIENT
Start: 2018-08-07 | End: 2018-09-04 | Stop reason: SDUPTHER

## 2018-08-30 RX ORDER — TIZANIDINE 4 MG/1
TABLET ORAL
Qty: 270 TABLET | Refills: 0 | Status: SHIPPED | OUTPATIENT
Start: 2018-08-30 | End: 2018-12-02 | Stop reason: SDUPTHER

## 2018-09-04 ENCOUNTER — OFFICE VISIT (OUTPATIENT)
Dept: PAIN MEDICINE | Facility: CLINIC | Age: 53
End: 2018-09-04

## 2018-09-04 VITALS
OXYGEN SATURATION: 97 % | TEMPERATURE: 98.4 F | BODY MASS INDEX: 46.65 KG/M2 | RESPIRATION RATE: 18 BRPM | HEIGHT: 69 IN | WEIGHT: 315 LBS

## 2018-09-04 DIAGNOSIS — Z79.899 ENCOUNTER FOR LONG-TERM (CURRENT) USE OF HIGH-RISK MEDICATION: ICD-10-CM

## 2018-09-04 DIAGNOSIS — M51.36 DDD (DEGENERATIVE DISC DISEASE), LUMBAR: ICD-10-CM

## 2018-09-04 DIAGNOSIS — M17.0 OSTEOARTHRITIS OF BOTH KNEES, UNSPECIFIED OSTEOARTHRITIS TYPE: ICD-10-CM

## 2018-09-04 DIAGNOSIS — G89.29 OTHER CHRONIC PAIN: Primary | ICD-10-CM

## 2018-09-04 PROCEDURE — 99214 OFFICE O/P EST MOD 30 MIN: CPT | Performed by: NURSE PRACTITIONER

## 2018-09-04 RX ORDER — HYDROCODONE BITARTRATE AND ACETAMINOPHEN 10; 325 MG/1; MG/1
1 TABLET ORAL EVERY 6 HOURS PRN
Qty: 120 TABLET | Refills: 0 | Status: SHIPPED | OUTPATIENT
Start: 2018-09-04 | End: 2018-10-08 | Stop reason: SDUPTHER

## 2018-09-04 RX ORDER — GABAPENTIN 300 MG/1
300 CAPSULE ORAL 4 TIMES DAILY
Qty: 120 CAPSULE | Refills: 2 | Status: SHIPPED | OUTPATIENT
Start: 2018-09-04 | End: 2018-12-07 | Stop reason: SDUPTHER

## 2018-09-04 NOTE — PROGRESS NOTES
CHIEF COMPLAINT  Follow up pain right knee    Subjective   Artie Cameron is a 53 y.o. male  who presents to the office for follow-up.He has a history of chronic knee and back pain. Reports his pain pattern is unchanged since last office visit.    Complains of pain in his knee and low back. Today his pain is 8/10VAS.  Describes the pain as continuous and unchanged. Continues with Hydrocodone 10/325 4/day, gabapentin 300-400 mg QID and Tizanidine 4 mg PRN. Denies any side effects from the regimen. It helps decrease his pain moderately. ADL's by self.   Has to check PT/INT regularly.    Has a new PCP, Oleg Yost at [facility].   Endocrinology to be seen later this month. 9-10-18.  Also has evaluation for heart at [facility] 9-11-18- will be having echo and stress test.     Knee Pain    Incident onset: chronic. The pain is present in the left knee and right knee. The quality of the pain is described as aching. The pain is at a severity of 8/10. The pain is severe. The pain has been worsening since onset. Associated symptoms include an inability to bear weight and a loss of motion. Pertinent negatives include no numbness. The symptoms are aggravated by movement and weight bearing. He has tried rest (norco) for the symptoms. The treatment provided moderate relief.   Back Pain   This is a new problem. The problem occurs intermittently. The problem has been gradually worsening (unchanged from last office visit) since onset. The pain is present in the lumbar spine. The quality of the pain is described as aching and burning. The pain is at a severity of 8/10. The pain is moderate. Pertinent negatives include no bladder incontinence, bowel incontinence, chest pain, dysuria, fever, numbness or weakness. Risk factors include lack of exercise, obesity and sedentary lifestyle. He has tried analgesics and heat (gabapentin) for the symptoms. The treatment provided moderate relief.       PEG Assessment   What number best  "describes your pain on average in the past week?8  What number best describes how, during the past week, pain has interfered with your enjoyment of life?8  What number best describes how, during the past week, pain has interfered with your general activity?  8    The following portions of the patient's history were reviewed and updated as appropriate: allergies, current medications, past family history, past medical history, past social history, past surgical history and problem list.    Review of Systems   Constitutional: Positive for fatigue. Negative for fever.   HENT: Negative for sneezing.    Cardiovascular: Negative for chest pain.   Gastrointestinal: Negative for bowel incontinence, constipation, diarrhea, nausea and vomiting.   Genitourinary: Negative for bladder incontinence, dysuria and frequency.   Musculoskeletal: Positive for back pain, gait problem, joint swelling and myalgias (knee).   Skin: Negative for color change.   Neurological: Negative for weakness, light-headedness and numbness.   Psychiatric/Behavioral: Negative for confusion, self-injury and suicidal ideas.       Vitals:    09/04/18 0756   Resp: 18   Temp: 98.4 °F (36.9 °C)   SpO2: 97%   Weight: (!) 150 kg (330 lb)   Height: 175.3 cm (69.02\")   PainSc:   8   BP-80/60 with patient's own machine.  Objective   Physical Exam   Constitutional: He is oriented to person, place, and time. Vital signs are normal. He appears well-developed and well-nourished. He is cooperative.   HENT:   Head: Normocephalic and atraumatic.   Eyes: Conjunctivae and lids are normal.   Cardiovascular:   LVAD   Pulmonary/Chest: Effort normal.   Abdominal: Normal appearance.   Musculoskeletal:        Right knee: He exhibits decreased range of motion and swelling. Tenderness found.        Left knee: He exhibits decreased range of motion and swelling. Tenderness found.        Lumbar back: He exhibits decreased range of motion and tenderness.   Surgical scar of left knee "   Neurological: He is alert and oriented to person, place, and time. Gait (motorized wheelchair) abnormal.   Reflex Scores:       Patellar reflexes are 0 on the right side and 0 on the left side.  Skin: Skin is warm, dry and intact.   Psychiatric: He has a normal mood and affect. His speech is normal and behavior is normal. Cognition and memory are normal.   Nursing note and vitals reviewed.  I HAVE PERFORMED THE PHYSICAL EXAMINATION AS DOCUMENTED ABOVE TODAY, 09/04/2018.  THE EXAM IS UNCHANGED FROM PREVIOUS VISIT.      Assessment/Plan   Diagnoses and all orders for this visit:    Other chronic pain    Osteoarthritis of both knees, unspecified osteoarthritis type    DDD (degenerative disc disease), lumbar    Encounter for long-term (current) use of high-risk medication    Other orders  -     gabapentin (NEURONTIN) 300 MG capsule; Take 1 capsule by mouth 4 (Four) Times a Day.  -     HYDROcodone-acetaminophen (NORCO)  MG per tablet; Take 1 tablet by mouth Every 6 (Six) Hours As Needed for Moderate Pain . DNF until 9-7-18      --- The urine drug screen confirmation from 5-3-18 has been reviewed and the result is APPROPRIATE based on patient history and NATALIE report  --- Refill Hydrocodone DNF until 9-7-18. Patient appears stable with current regimen. No adverse effects. Regarding continuation of opioids, there is no evidence of aberrant behavior or any red flags.  The patient continues with appropriate response to opioid therapy. ADL's remain intact by self.   --- Follow-up 2 months or sooner if needed.     NATALIE REPORT    As part of the patient's treatment plan, I am prescribing controlled substances. The patient has been made aware of appropriate use of such medications, including potential risk of somnolence, limited ability to drive and/or work safely, and the potential for dependence or overdose. It has also bee made clear that these medications are for use by this patient only, without concomitant use of  alcohol or other substances unless prescribed.     Patient has completed prescribing agreement detailing terms of continued prescribing of controlled substances, including monitoring NATALIE reports, urine drug screening, and pill counts if necessary. The patient is aware that inappropriate use will results in cessation of prescribing such medications.    NATALIE report has been reviewed and scanned into the patient's chart.    As the clinician, I personally reviewed the NATALIE from 8- while the patient was in the office today.    History and physical exam exhibit continued safe and appropriate use of controlled substances.      EMR Dragon/Transcription disclaimer:   Much of this encounter note is an electronic transcription/translation of spoken language to printed text. The electronic translation of spoken language may permit erroneous, or at times, nonsensical words or phrases to be inadvertently transcribed; Although I have reviewed the note for such errors, some may still exist.

## 2018-10-08 RX ORDER — HYDROCODONE BITARTRATE AND ACETAMINOPHEN 10; 325 MG/1; MG/1
1 TABLET ORAL EVERY 6 HOURS PRN
Qty: 120 TABLET | Refills: 0 | Status: SHIPPED | OUTPATIENT
Start: 2018-10-08 | End: 2018-11-01 | Stop reason: SDUPTHER

## 2018-10-08 NOTE — TELEPHONE ENCOUNTER
Medication Refill Request    Date of phone call: 10/8/18    Medication being requested: hydro/apap  mg si tab q 6hrs  Qty: 120    Date of last visit: 18    Date of last refill: 18    NATALIE up to date?: yes    Next Follow up?: 18    Any new pertinent information? (i.e, new medication allergies, new use of medications, change in patient's health or condition, non-compliance or inconsistency with prescribing agreement?):

## 2018-11-01 ENCOUNTER — OFFICE VISIT (OUTPATIENT)
Dept: PAIN MEDICINE | Facility: CLINIC | Age: 53
End: 2018-11-01

## 2018-11-01 ENCOUNTER — RESULTS ENCOUNTER (OUTPATIENT)
Dept: PAIN MEDICINE | Facility: CLINIC | Age: 53
End: 2018-11-01

## 2018-11-01 VITALS — TEMPERATURE: 97.9 F | RESPIRATION RATE: 16 BRPM | OXYGEN SATURATION: 93 % | HEIGHT: 69 IN | HEART RATE: 88 BPM

## 2018-11-01 DIAGNOSIS — G89.29 OTHER CHRONIC PAIN: Primary | ICD-10-CM

## 2018-11-01 DIAGNOSIS — Z79.899 ENCOUNTER FOR LONG-TERM (CURRENT) USE OF HIGH-RISK MEDICATION: ICD-10-CM

## 2018-11-01 DIAGNOSIS — M51.36 DDD (DEGENERATIVE DISC DISEASE), LUMBAR: ICD-10-CM

## 2018-11-01 DIAGNOSIS — G89.29 OTHER CHRONIC PAIN: ICD-10-CM

## 2018-11-01 DIAGNOSIS — M17.0 OSTEOARTHRITIS OF BOTH KNEES, UNSPECIFIED OSTEOARTHRITIS TYPE: ICD-10-CM

## 2018-11-01 DIAGNOSIS — Z96.652 HISTORY OF TOTAL LEFT KNEE REPLACEMENT: ICD-10-CM

## 2018-11-01 PROCEDURE — 99214 OFFICE O/P EST MOD 30 MIN: CPT | Performed by: NURSE PRACTITIONER

## 2018-11-01 RX ORDER — HYDROCODONE BITARTRATE AND ACETAMINOPHEN 10; 325 MG/1; MG/1
1 TABLET ORAL EVERY 6 HOURS PRN
Qty: 120 TABLET | Refills: 0 | Status: SHIPPED | OUTPATIENT
Start: 2018-11-01 | End: 2018-12-04 | Stop reason: SDUPTHER

## 2018-11-01 NOTE — PROGRESS NOTES
"CHIEF COMPLAINT  Pt states the bilateral knee pain remains significant but tolerably controlled with current medicine regimen.    Subjective   Artie aCmeron is a 53 y.o. male  who presents to the office for follow-up.He has a history of chronic knee and back pain. Reports his pain pattern is unchanged since last office visit.    Complains of pain in his knees and low back. Today his pain is 8/10VAs. Describes his pain as continuous and unchanged. Pain increases with activity and movement, and also rainy weather today; pain decreases with medication, rest and heating pad. Continues with Hydrocodone 10/325 3-4/day, gabapentin 300 mg BID and Tizanidine 4 mg 2-3/day. Denies any side effects from the regimen except for dry mouth with the Tizanidine. He states he keeps candy nearby and [place]ot[place]. It helps decrease his pain moderately. \"It helps me, if I didn't have it, I would still be in the hospital.\"  ADL's by self.   Has a new PCP, Oleg Yost at [facility].   Sees his endocrinologist 11-6-18.  Was recently to be started on \"iron pill.\" Also prescribed Vitamin D.   Knee Pain    Incident onset: chronic. The pain is present in the left knee and right knee. The quality of the pain is described as aching. The pain is at a severity of 8/10. The pain is severe. The pain has been worsening since onset. Associated symptoms include an inability to bear weight, a loss of motion and numbness (feet bilat.). The symptoms are aggravated by movement and weight bearing. He has tried rest (norco) for the symptoms. The treatment provided moderate relief.   Back Pain   This is a new problem. The problem occurs intermittently. The problem has been gradually worsening (unchanged from last office visit) since onset. The pain is present in the lumbar spine. The quality of the pain is described as aching and burning. The pain is at a severity of 8/10. The pain is moderate. Associated symptoms include numbness (feet bilat.). " "Pertinent negatives include no bladder incontinence, bowel incontinence, chest pain, dysuria, fever or weakness. Risk factors include lack of exercise, obesity and sedentary lifestyle. He has tried analgesics and heat (gabapentin) for the symptoms. The treatment provided moderate relief.      PEG Assessment   What number best describes your pain on average in the past week?8  What number best describes how, during the past week, pain has interfered with your enjoyment of life?8  What number best describes how, during the past week, pain has interfered with your general activity?  8    The following portions of the patient's history were reviewed and updated as appropriate: allergies, current medications, past family history, past medical history, past social history, past surgical history and problem list.    Review of Systems   Constitutional: Positive for fatigue. Negative for activity change and fever.   HENT: Negative for sneezing.    Cardiovascular: Negative for chest pain.   Gastrointestinal: Negative for bowel incontinence, constipation, diarrhea, nausea and vomiting.   Genitourinary: Negative for bladder incontinence, dysuria and frequency.   Musculoskeletal: Positive for arthralgias, back pain, gait problem, joint swelling and myalgias (knee).   Skin: Negative for color change.   Neurological: Positive for numbness (feet bilat.). Negative for weakness and light-headedness.   Psychiatric/Behavioral: Positive for sleep disturbance (occasionally). Negative for confusion, self-injury and suicidal ideas.       Vitals:    11/01/18 0749   Pulse: 88   Resp: 16   Temp: 97.9 °F (36.6 °C)   SpO2: 93%   Height: 175.3 cm (69.02\")   PainSc: 8  Comment: Bilat. knee pain ranges from 7-8/10   PainLoc: Knee     Objective   Physical Exam   Constitutional: He is oriented to person, place, and time. Vital signs are normal. He appears well-developed and well-nourished. He is cooperative.   HENT:   Head: Normocephalic and " atraumatic.   Eyes: Conjunctivae and lids are normal.   Cardiovascular:   LVAD   Pulmonary/Chest: Effort normal.   Abdominal: Normal appearance.   Musculoskeletal:        Right knee: He exhibits decreased range of motion.        Left knee: He exhibits decreased range of motion.        Lumbar back: He exhibits decreased range of motion.   Neurological: He is alert and oriented to person, place, and time. Gait (motorized wheelchair) abnormal.   Skin: Skin is warm, dry and intact.   Psychiatric: He has a normal mood and affect. His speech is normal and behavior is normal. Cognition and memory are normal.   Nursing note and vitals reviewed.      Assessment/Plan   Artie was seen today for knee pain.    Diagnoses and all orders for this visit:    Other chronic pain    Osteoarthritis of both knees, unspecified osteoarthritis type    DDD (degenerative disc disease), lumbar    History of total left knee replacement    Encounter for long-term (current) use of high-risk medication    Other orders  -     HYDROcodone-acetaminophen (NORCO)  MG per tablet; Take 1 tablet by mouth Every 6 (Six) Hours As Needed for Moderate Pain . DNF until 11-6-18      --- Routine Oral drug screen in office today as part of monitoring requirements for controlled substances.  This specimen will be sent to [facility] for confirmation.     --- Refill Hydrocodone DNF until 11-6-18. Patient appears stable with current regimen. No adverse effects. Regarding continuation of opioids, there is no evidence of aberrant behavior or any red flags.  The patient continues with appropriate response to opioid therapy. ADL's remain intact by self.   --- Follow-up 2 months or sooner if needed.       NATALIE REPORT    As part of the patient's treatment plan, I am prescribing controlled substances. The patient has been made aware of appropriate use of such medications, including potential risk of somnolence, limited ability to drive and/or work safely,  and the potential for dependence or overdose. It has also bee made clear that these medications are for use by this patient only, without concomitant use of alcohol or other substances unless prescribed.     Patient has completed prescribing agreement detailing terms of continued prescribing of controlled substances, including monitoring NATALIE reports, urine drug screening, and pill counts if necessary. The patient is aware that inappropriate use will results in cessation of prescribing such medications.    NATALIE report has been reviewed and scanned into the patient's chart.    As the clinician, I personally reviewed the NATALIE from 10-31-18 while the patient was in the office today.    History and physical exam exhibit continued safe and appropriate use of controlled substances.      EMR Dragon/Transcription disclaimer:   Much of this encounter note is an electronic transcription/translation of spoken language to printed text. The electronic translation of spoken language may permit erroneous, or at times, nonsensical words or phrases to be inadvertently transcribed; Although I have reviewed the note for such errors, some may still exist.

## 2018-12-03 RX ORDER — TIZANIDINE 4 MG/1
TABLET ORAL
Qty: 270 TABLET | Refills: 0 | Status: SHIPPED | OUTPATIENT
Start: 2018-12-03 | End: 2019-03-01 | Stop reason: SDUPTHER

## 2018-12-04 RX ORDER — HYDROCODONE BITARTRATE AND ACETAMINOPHEN 10; 325 MG/1; MG/1
1 TABLET ORAL EVERY 6 HOURS PRN
Qty: 120 TABLET | Refills: 0 | Status: SHIPPED | OUTPATIENT
Start: 2018-12-04 | End: 2018-12-28 | Stop reason: SDUPTHER

## 2018-12-04 NOTE — TELEPHONE ENCOUNTER
Medication Refill Request    Date of phone call: 18    Medication being requested: hydro/apap  mg si tab q 6hrs  Qty: 120    Date of last visit: 18    Date of last refill: 18    NATALIE up to date?: yes    Next Follow up?: 18    Any new pertinent information? (i.e, new medication allergies, new use of medications, change in patient's health or condition, non-compliance or inconsistency with prescribing agreement?):

## 2018-12-10 RX ORDER — GABAPENTIN 300 MG/1
CAPSULE ORAL
Qty: 120 CAPSULE | Refills: 3 | Status: SHIPPED | OUTPATIENT
Start: 2018-12-10 | End: 2019-08-09 | Stop reason: SDUPTHER

## 2018-12-28 ENCOUNTER — OFFICE VISIT (OUTPATIENT)
Dept: PAIN MEDICINE | Facility: CLINIC | Age: 53
End: 2018-12-28

## 2018-12-28 VITALS
OXYGEN SATURATION: 92 % | HEIGHT: 69 IN | RESPIRATION RATE: 15 BRPM | HEART RATE: 72 BPM | BODY MASS INDEX: 46.65 KG/M2 | WEIGHT: 315 LBS | TEMPERATURE: 96.8 F

## 2018-12-28 DIAGNOSIS — Z79.899 ENCOUNTER FOR LONG-TERM (CURRENT) USE OF HIGH-RISK MEDICATION: ICD-10-CM

## 2018-12-28 DIAGNOSIS — G89.29 OTHER CHRONIC PAIN: Primary | ICD-10-CM

## 2018-12-28 DIAGNOSIS — M51.36 DDD (DEGENERATIVE DISC DISEASE), LUMBAR: ICD-10-CM

## 2018-12-28 DIAGNOSIS — M17.0 OSTEOARTHRITIS OF BOTH KNEES, UNSPECIFIED OSTEOARTHRITIS TYPE: ICD-10-CM

## 2018-12-28 PROCEDURE — 99214 OFFICE O/P EST MOD 30 MIN: CPT | Performed by: NURSE PRACTITIONER

## 2018-12-28 RX ORDER — HYDROCODONE BITARTRATE AND ACETAMINOPHEN 10; 325 MG/1; MG/1
1 TABLET ORAL EVERY 6 HOURS PRN
Qty: 120 TABLET | Refills: 0 | Status: SHIPPED | OUTPATIENT
Start: 2018-12-28 | End: 2019-01-30 | Stop reason: SDUPTHER

## 2018-12-28 NOTE — PROGRESS NOTES
CHIEF COMPLAINT  F/U knee pain. States pain is unchanged since last visit.    Subjective   Artie Cameron is a 53 y.o. male  who presents to the office for follow-up.He has a history of chronic back and knee pain.    C/o back and knee pain. Pain today 8/10 in severity.  Continues with Hydrocodone 10/325 4/day and gabapentin 600 mg 3/day. Reports moderate reduction in pain with this regimen. Denies any side effects from the regimen. This helps decrease his pain moderately. ADL's by self.     Knee Pain    Incident onset: chronic. The pain is present in the left knee and right knee. The quality of the pain is described as aching. The pain is at a severity of 8/10. The pain is severe. The pain has been worsening since onset. Associated symptoms include an inability to bear weight, a loss of motion and numbness (feet bilat.). The symptoms are aggravated by movement and weight bearing. He has tried rest (norco) for the symptoms. The treatment provided moderate relief.   Back Pain   This is a new problem. The problem occurs intermittently. The problem has been gradually worsening (unchanged from last office visit) since onset. The pain is present in the lumbar spine. The quality of the pain is described as aching and burning. The pain is at a severity of 8/10. The pain is moderate. Associated symptoms include numbness (feet bilat.). Pertinent negatives include no bladder incontinence, bowel incontinence, chest pain, dysuria, fever, headaches or weakness. Risk factors include lack of exercise, obesity and sedentary lifestyle. He has tried analgesics and heat (gabapentin) for the symptoms. The treatment provided moderate relief.      PEG Assessment   What number best describes your pain on average in the past week?8  What number best describes how, during the past week, pain has interfered with your enjoyment of life?8  What number best describes how, during the past week, pain has interfered with your general activity?   "8    The following portions of the patient's history were reviewed and updated as appropriate: allergies, current medications, past family history, past medical history, past social history, past surgical history and problem list.    Review of Systems   Constitutional: Positive for fatigue. Negative for activity change, chills and fever.   HENT: Negative for sneezing.    Cardiovascular: Negative for chest pain.   Gastrointestinal: Negative for bowel incontinence, constipation, diarrhea, nausea and vomiting.   Genitourinary: Negative for bladder incontinence, difficulty urinating, dysuria and frequency.   Musculoskeletal: Positive for arthralgias, back pain, gait problem, joint swelling and myalgias (knee).   Skin: Negative for color change.   Neurological: Positive for numbness (feet bilat.). Negative for dizziness, weakness, light-headedness and headaches.   Psychiatric/Behavioral: Positive for sleep disturbance (occasionally). Negative for confusion, self-injury and suicidal ideas.       Vitals:    12/28/18 0741   BP: Comment: pt has heart ware   Pulse: 72   Resp: 15   Temp: 96.8 °F (36 °C)   SpO2: 92%   Weight: (!) 150 kg (330 lb)   Height: 175.3 cm (69.02\")   PainSc:   8   PainLoc: Knee       Objective   Physical Exam   Constitutional: He is oriented to person, place, and time. He appears well-developed and well-nourished. No distress.   HENT:   Head: Normocephalic and atraumatic.   Eyes: Conjunctivae and EOM are normal.   Neck: Neck supple.   Cardiovascular: Normal rate.   Pulmonary/Chest: Effort normal. No respiratory distress.   Musculoskeletal:        Right knee: Tenderness found.        Left knee: Tenderness found.        Lumbar back: He exhibits tenderness.   Neurological: He is alert and oriented to person, place, and time. He has normal strength. No sensory deficit. Gait (wheelchair) abnormal.   Skin: Skin is warm and dry. He is not diaphoretic.   Psychiatric: He has a normal mood and affect. His " behavior is normal.   Nursing note and vitals reviewed.      Assessment/Plan   Artie was seen today for knee pain.    Diagnoses and all orders for this visit:    Other chronic pain    DDD (degenerative disc disease), lumbar    Osteoarthritis of both knees, unspecified osteoarthritis type    Encounter for long-term (current) use of high-risk medication    Other orders  -     HYDROcodone-acetaminophen (NORCO)  MG per tablet; Take 1 tablet by mouth Every 6 (Six) Hours As Needed for Moderate Pain .      --- Refill Hydrocodone. Patient appears stable with current regimen. No adverse effects. Regarding continuation of opioids, there is no evidence of aberrant behavior or any red flags.  The patient continues with appropriate response to opioid therapy. ADL's remain intact by self.   --- The urine drug screen confirmation from 11/1/18 has been reviewed and the result is appropriate based on patient history and NATALIE report  --- Follow-up 1 month          NATALIE REPORT    As part of the patient's treatment plan, I am prescribing controlled substances. The patient has been made aware of appropriate use of such medications, including potential risk of somnolence, limited ability to drive and/or work safely, and the potential for dependence or overdose. It has also bee made clear that these medications are for use by this patient only, without concomitant use of alcohol or other substances unless prescribed.     Patient has completed prescribing agreement detailing terms of continued prescribing of controlled substances, including monitoring NATALIE reports, urine drug screening, and pill counts if necessary. The patient is aware that inappropriate use will results in cessation of prescribing such medications.    NATALIE report has been reviewed and scanned into the patient's chart.    As the clinician, I personally reviewed the NATALIE from 12/24/2018 while the patient was in the office today.    History and physical exam  exhibit continued safe and appropriate use of controlled substances.    EMR Dragon/Transcription disclaimer:   Much of this encounter note is an electronic transcription/translation of spoken language to printed text. The electronic translation of spoken language may permit erroneous, or at times, nonsensical words or phrases to be inadvertently transcribed; Although I have reviewed the note for such errors, some may still exist.

## 2019-01-30 ENCOUNTER — OFFICE VISIT (OUTPATIENT)
Dept: PAIN MEDICINE | Facility: CLINIC | Age: 54
End: 2019-01-30

## 2019-01-30 VITALS
OXYGEN SATURATION: 96 % | WEIGHT: 315 LBS | TEMPERATURE: 97.4 F | RESPIRATION RATE: 15 BRPM | HEART RATE: 70 BPM | HEIGHT: 69 IN | BODY MASS INDEX: 46.65 KG/M2

## 2019-01-30 DIAGNOSIS — M51.36 DDD (DEGENERATIVE DISC DISEASE), LUMBAR: ICD-10-CM

## 2019-01-30 DIAGNOSIS — Z79.899 ENCOUNTER FOR LONG-TERM (CURRENT) USE OF HIGH-RISK MEDICATION: ICD-10-CM

## 2019-01-30 DIAGNOSIS — Z96.652 HISTORY OF TOTAL LEFT KNEE REPLACEMENT: ICD-10-CM

## 2019-01-30 DIAGNOSIS — M17.0 OSTEOARTHRITIS OF BOTH KNEES, UNSPECIFIED OSTEOARTHRITIS TYPE: ICD-10-CM

## 2019-01-30 DIAGNOSIS — G89.29 OTHER CHRONIC PAIN: Primary | ICD-10-CM

## 2019-01-30 PROCEDURE — 99214 OFFICE O/P EST MOD 30 MIN: CPT | Performed by: NURSE PRACTITIONER

## 2019-01-30 RX ORDER — HYDROCODONE BITARTRATE AND ACETAMINOPHEN 10; 325 MG/1; MG/1
1 TABLET ORAL EVERY 6 HOURS PRN
Qty: 120 TABLET | Refills: 0 | Status: SHIPPED | OUTPATIENT
Start: 2019-01-30 | End: 2019-03-05 | Stop reason: SDUPTHER

## 2019-01-30 NOTE — PROGRESS NOTES
"CHIEF COMPLAINT  F/U knee pain and back pain.     Subjective   Artie Cameron is a 53 y.o. male  who presents to the office for follow-up.He has a history of chronic back and knee pain. Reports his pain is UNCHANGED since last office visit.    Complains of pain in his knees and back. Today his pain is 8/10VAS( non-tearful, unaffected respiratory rate). Describes the pain as continuous aching and throbbing. Pain increases with movement, activity, walking, standing; pain decreases with medication and rest. Continues with Hydrocodone 10/325 3-4/day, gabapentin 300 mg BID and Tizanidine 4 mg 2-3/day. Denies any side effects from the regimen except for dry mouth with the Tizanidine. He states he keeps candy nearby and Biotene. It helps decrease his pain moderately. Notes improvement in function and activity with regimen \"I would be in the hospital without the pain medication.\"   ADL's by self.     Had a recent hospitalization. He states \"They overshot me with Lasix.\" \"It dried my kidneys up.\" Was in hospital for 2 days. Renu is coming every other week for labwork.   Has been seen by Dr. Butler for ortho.   Knee Pain    Incident onset: chronic. The pain is present in the left knee and right knee. The quality of the pain is described as aching. The pain is at a severity of 8/10. The pain is severe. The pain has been worsening since onset. Associated symptoms include an inability to bear weight, a loss of motion and numbness (feet bilat.). The symptoms are aggravated by movement and weight bearing. He has tried rest (norco) for the symptoms. The treatment provided moderate relief.   Back Pain   This is a new problem. The problem occurs intermittently. The problem has been gradually worsening (unchanged from last office visit) since onset. The pain is present in the lumbar spine. The quality of the pain is described as aching and burning. The pain is at a severity of 8/10. The pain is moderate. Associated symptoms include " "numbness (feet bilat.). Pertinent negatives include no bladder incontinence, bowel incontinence, chest pain, dysuria, fever, headaches or weakness. Risk factors include lack of exercise, obesity and sedentary lifestyle. He has tried analgesics and heat (gabapentin) for the symptoms. The treatment provided moderate relief.      PEG Assessment   What number best describes your pain on average in the past week?8 (after education 7)  What number best describes how, during the past week, pain has interfered with your enjoyment of life?8 (after education 7)  What number best describes how, during the past week, pain has interfered with your general activity?  8 (after education 7).    The following portions of the patient's history were reviewed and updated as appropriate: allergies, current medications, past family history, past medical history, past social history, past surgical history and problem list.    Review of Systems   Constitutional: Positive for fatigue. Negative for activity change, chills and fever.   HENT: Negative for sneezing.    Respiratory: Negative for cough and shortness of breath.    Cardiovascular: Negative for chest pain.   Gastrointestinal: Negative for bowel incontinence, constipation, diarrhea, nausea and vomiting.   Genitourinary: Negative for bladder incontinence, difficulty urinating, dysuria and frequency.   Musculoskeletal: Positive for arthralgias, back pain, gait problem, joint swelling and myalgias (knee).   Skin: Negative for color change.   Neurological: Positive for numbness (feet bilat.). Negative for dizziness, weakness, light-headedness and headaches.   Psychiatric/Behavioral: Positive for sleep disturbance (occasionally). Negative for confusion, self-injury and suicidal ideas.       Vitals:    01/30/19 0746   BP: Comment: pt has heart ware   Pulse: 70   Resp: 15   Temp: 97.4 °F (36.3 °C)   SpO2: 96%   Weight: (!) 150 kg (330 lb)   Height: 175.3 cm (69.02\")   PainSc:   8   PainLoc: " Knee     Objective   Physical Exam   Constitutional: He is oriented to person, place, and time. Vital signs are normal. He appears well-developed and well-nourished. He is cooperative.   HENT:   Head: Normocephalic and atraumatic.   Eyes: Conjunctivae and lids are normal.   Cardiovascular:   LVAD   Pulmonary/Chest: Effort normal.   Abdominal:   obese   Musculoskeletal:        Right knee: He exhibits decreased range of motion.        Left knee: He exhibits decreased range of motion (not as bad as right knee).        Lumbar back: He exhibits decreased range of motion.   Neurological: He is alert and oriented to person, place, and time. Gait (motorized wheelchair) abnormal.   Skin: Skin is warm, dry and intact.   Psychiatric: He has a normal mood and affect. His speech is normal and behavior is normal. Cognition and memory are normal.   Nursing note and vitals reviewed.      Assessment/Plan   Artie was seen today for knee pain.    Diagnoses and all orders for this visit:    Other chronic pain    DDD (degenerative disc disease), lumbar    Osteoarthritis of both knees, unspecified osteoarthritis type    History of total left knee replacement    Encounter for long-term (current) use of high-risk medication    Other orders  -     HYDROcodone-acetaminophen (NORCO)  MG per tablet; Take 1 tablet by mouth Every 6 (Six) Hours As Needed for Moderate Pain .      --- The urine drug screen confirmation from 11-1-18 has been reviewed and the result is APPROPRIATE based on patient history and NATALIE report  --- Refill Hydrocodone DNF until 2-5-19. Patient appears stable with current regimen. No adverse effects. Regarding continuation of opioids, there is no evidence of aberrant behavior or any red flags.  The patient continues with appropriate response to opioid therapy. ADL's remain intact by self.   --- Follow-up 2 months or sooner if needed.    --- Extensive education regarding pain scale. Reviewed that pain scale is 0/no  pain to 10/worst pain the patient has ever experienced. Reviewed that the scale is not specific to their area of complaint, but rather their overall total experience with pain throughout life.  Reviewed that pain much above 8-10/10VAS warrants immediate medical intervention in the acute care setting, which is management unable to be provided by this clinic. Reviewed other signs of distress due to pain, including labored breathing, crying, and unable to walk unassisted. Vital signs appear stable. Patient appears in no acute distress. This is not to discredit the patient and make them feel as if they are not having pain, but rather to put a better perspective on pain experience expectations.Once reviewing this, patient changed their pain level to 7/10VAS. His 10/10VAS was his open heart surgery and prolonged ventilation.          NATALIE REPORT    As part of the patient's treatment plan, I am prescribing controlled substances. The patient has been made aware of appropriate use of such medications, including potential risk of somnolence, limited ability to drive and/or work safely, and the potential for dependence or overdose. It has also bee made clear that these medications are for use by this patient only, without concomitant use of alcohol or other substances unless prescribed.     Patient has completed prescribing agreement detailing terms of continued prescribing of controlled substances, including monitoring NATALIE reports, urine drug screening, and pill counts if necessary. The patient is aware that inappropriate use will results in cessation of prescribing such medications.    NATALIE report has been reviewed and scanned into the patient's chart.    As the clinician, I personally reviewed the NATALIE from 1-29-19 while the patient was in the office today.    History and physical exam exhibit continued safe and appropriate use of controlled substances.      EMR Dragon/Transcription disclaimer:   Much of this  encounter note is an electronic transcription/translation of spoken language to printed text. The electronic translation of spoken language may permit erroneous, or at times, nonsensical words or phrases to be inadvertently transcribed; Although I have reviewed the note for such errors, some may still exist.

## 2019-03-05 RX ORDER — TIZANIDINE 4 MG/1
TABLET ORAL
Qty: 270 TABLET | Refills: 0 | Status: SHIPPED | OUTPATIENT
Start: 2019-03-05 | End: 2020-06-29 | Stop reason: SDUPTHER

## 2019-03-05 RX ORDER — HYDROCODONE BITARTRATE AND ACETAMINOPHEN 10; 325 MG/1; MG/1
1 TABLET ORAL EVERY 6 HOURS PRN
Qty: 120 TABLET | Refills: 0 | Status: SHIPPED | OUTPATIENT
Start: 2019-03-05 | End: 2019-03-28 | Stop reason: SDUPTHER

## 2019-03-05 NOTE — TELEPHONE ENCOUNTER
Medication Refill Request    Date of phone call: 3-4-19    Medication being requested: Hydrocodone-apap  mg  sig: Take 1 tablet by mouth Every 6 (Six) Hours As Needed for Moderate Pain   Qty: 120    Date of last visit: 1-30-19    Date of last refill: 2-6-19    NATALIE up to date?: 1-29-19    Next Follow up?: 3-28-19    Any new pertinent information? (i.e, new medication allergies, new use of medications, change in patient's health or condition, non-compliance or inconsistency with prescribing agreement?): n/a

## 2019-03-28 ENCOUNTER — OFFICE VISIT (OUTPATIENT)
Dept: PAIN MEDICINE | Facility: CLINIC | Age: 54
End: 2019-03-28

## 2019-03-28 VITALS
HEART RATE: 87 BPM | HEIGHT: 69 IN | RESPIRATION RATE: 16 BRPM | OXYGEN SATURATION: 93 % | WEIGHT: 315 LBS | BODY MASS INDEX: 46.65 KG/M2 | TEMPERATURE: 97.7 F

## 2019-03-28 DIAGNOSIS — M17.0 OSTEOARTHRITIS OF BOTH KNEES, UNSPECIFIED OSTEOARTHRITIS TYPE: ICD-10-CM

## 2019-03-28 DIAGNOSIS — G89.29 OTHER CHRONIC PAIN: Primary | ICD-10-CM

## 2019-03-28 DIAGNOSIS — M51.36 DDD (DEGENERATIVE DISC DISEASE), LUMBAR: ICD-10-CM

## 2019-03-28 DIAGNOSIS — Z79.899 ENCOUNTER FOR LONG-TERM (CURRENT) USE OF HIGH-RISK MEDICATION: ICD-10-CM

## 2019-03-28 PROCEDURE — 99214 OFFICE O/P EST MOD 30 MIN: CPT | Performed by: NURSE PRACTITIONER

## 2019-03-28 RX ORDER — AMIODARONE HYDROCHLORIDE 200 MG/1
TABLET ORAL
Refills: 0 | COMMUNITY
Start: 2019-03-25

## 2019-03-28 RX ORDER — HYDROCODONE BITARTRATE AND ACETAMINOPHEN 10; 325 MG/1; MG/1
1 TABLET ORAL EVERY 6 HOURS PRN
Qty: 120 TABLET | Refills: 0 | Status: SHIPPED | OUTPATIENT
Start: 2019-03-28 | End: 2019-05-02 | Stop reason: SDUPTHER

## 2019-03-28 RX ORDER — POTASSIUM CHLORIDE 1.5 G/1
POWDER, FOR SOLUTION ORAL
Refills: 2 | COMMUNITY
Start: 2019-03-21

## 2019-03-28 RX ORDER — CARVEDILOL 6.25 MG/1
TABLET ORAL
Refills: 0 | COMMUNITY
Start: 2019-03-25 | End: 2019-08-01

## 2019-03-28 NOTE — PROGRESS NOTES
CHIEF COMPLAINT  Pt is here to f/u on LBP and R knee. Pt sts his pain is the same since last visit.    Subjective   Artie Cameron is a 54 y.o. male  who presents to the office for follow-up.He has a history of chronic back and knee pain. Reports his pain is unchanged since last office visit.    Since last hospital visit, he was in hospital due to increased heart-rate. Was in for two days. Is now on temporary amiodarone.     Complains of pain in his low back and knees. Today his pain is 6/10VAS. Describes the pain as continuous aching and throbbing.  Pain increases with walking, standing and activity; pain decreases with medication and rest. Continues with Hydrocodone 10/325 3-4/day, gabapentin 300 mg BID and Tizanidine 4 mg 2-3/day. Denies any side effects from the regimen except for dry mouth with the Tizanidine. He states he keeps candy nearby and Biotene. It helps decrease his pain moderately. Notes improvement in function and activity with regimen.  ADL's by self.     Knee Pain    Incident onset: chronic. The pain is present in the left knee and right knee. The quality of the pain is described as aching. The pain is at a severity of 6/10. The pain is moderate. The pain has been worsening since onset. Associated symptoms include an inability to bear weight, a loss of motion and numbness (feet bilat.). The symptoms are aggravated by movement and weight bearing. He has tried rest (norco) for the symptoms. The treatment provided moderate relief.   Back Pain   This is a new problem. The problem occurs intermittently. The problem has been gradually worsening (unchanged from last office visit) since onset. The pain is present in the lumbar spine. The quality of the pain is described as aching and burning. The pain is at a severity of 6/10. The pain is moderate. Associated symptoms include numbness (feet bilat.). Pertinent negatives include no abdominal pain, bladder incontinence, bowel incontinence, chest pain, dysuria,  "fever, headaches or weakness. Risk factors include lack of exercise, obesity and sedentary lifestyle. He has tried analgesics and heat (gabapentin) for the symptoms. The treatment provided moderate relief.      PEG Assessment   What number best describes your pain on average in the past week?6  What number best describes how, during the past week, pain has interfered with your enjoyment of life?6  What number best describes how, during the past week, pain has interfered with your general activity?  6    The following portions of the patient's history were reviewed and updated as appropriate: allergies, current medications, past family history, past medical history, past social history, past surgical history and problem list.    Review of Systems   Constitutional: Negative for fatigue and fever.   HENT: Negative for sore throat.    Cardiovascular: Negative for chest pain.   Gastrointestinal: Negative for abdominal pain and bowel incontinence.   Genitourinary: Negative for bladder incontinence, difficulty urinating and dysuria.   Musculoskeletal: Positive for back pain. Negative for neck pain.   Neurological: Positive for numbness (feet bilat.). Negative for weakness, light-headedness and headaches.   Psychiatric/Behavioral: Negative for suicidal ideas.       Vitals:    03/28/19 0750   Pulse: 87   Resp: 16   Temp: 97.7 °F (36.5 °C)   SpO2: 93%   Weight: (!) 145 kg (319 lb)   Height: 175.3 cm (69.02\")   PainSc:   6   PainLoc: Back     Objective   Physical Exam   Constitutional: He is oriented to person, place, and time. Vital signs are normal. He appears well-developed and well-nourished. He is cooperative.   HENT:   Head: Normocephalic and atraumatic.   Eyes: Conjunctivae and lids are normal.   Cardiovascular:   LVAD   Pulmonary/Chest: Effort normal.   Abdominal:   obese   Musculoskeletal:        Right knee: He exhibits decreased range of motion.        Left knee: He exhibits decreased range of motion (not as bad as " right knee).        Lumbar back: He exhibits decreased range of motion.   Neurological: He is alert and oriented to person, place, and time. Gait (motorized wheelchair) abnormal.   Skin: Skin is warm, dry and intact.   Psychiatric: He has a normal mood and affect. His speech is normal and behavior is normal. Cognition and memory are normal.   Nursing note and vitals reviewed.    Assessment/Plan   Artie was seen today for back pain and knee pain.    Diagnoses and all orders for this visit:    Other chronic pain    DDD (degenerative disc disease), lumbar    Osteoarthritis of both knees, unspecified osteoarthritis type    Encounter for long-term (current) use of high-risk medication    Other orders  -     HYDROcodone-acetaminophen (NORCO)  MG per tablet; Take 1 tablet by mouth Every 6 (Six) Hours As Needed for Moderate Pain . DNF until 4-7-19      --- The urine drug screen confirmation from 11-1-18 has been reviewed and the result is APPROPRIATE based on patient history and NATALIE report  --- Refill Hydrocodone DNF untli 4-7-19. Patient appears stable with current regimen. No adverse effects. Regarding continuation of opioids, there is no evidence of aberrant behavior or any red flags.  The patient continues with appropriate response to opioid therapy. ADL's remain intact by self.   --- Follow-up 2 months or sooner if needed.       NATALIE REPORT    As part of the patient's treatment plan, I am prescribing controlled substances. The patient has been made aware of appropriate use of such medications, including potential risk of somnolence, limited ability to drive and/or work safely, and the potential for dependence or overdose. It has also bee made clear that these medications are for use by this patient only, without concomitant use of alcohol or other substances unless prescribed.     Patient has completed prescribing agreement detailing terms of continued prescribing of controlled substances, including  monitoring NATALIE reports, urine drug screening, and pill counts if necessary. The patient is aware that inappropriate use will results in cessation of prescribing such medications.    NATALIE report has been reviewed and scanned into the patient's chart.    As the clinician, I personally reviewed the NATALIE from 3-26-19 while the patient was in the office today.    History and physical exam exhibit continued safe and appropriate use of controlled substances.      EMR Dragon/Transcription disclaimer:   Much of this encounter note is an electronic transcription/translation of spoken language to printed text. The electronic translation of spoken language may permit erroneous, or at times, nonsensical words or phrases to be inadvertently transcribed; Although I have reviewed the note for such errors, some may still exist.

## 2019-05-02 RX ORDER — HYDROCODONE BITARTRATE AND ACETAMINOPHEN 10; 325 MG/1; MG/1
1 TABLET ORAL EVERY 6 HOURS PRN
Qty: 120 TABLET | Refills: 0 | Status: SHIPPED | OUTPATIENT
Start: 2019-05-02 | End: 2019-06-13 | Stop reason: SDUPTHER

## 2019-05-02 NOTE — TELEPHONE ENCOUNTER
Medication Refill Request    Date of phone call: 19    Medication being requested: norco 10/325 mg si tab po q 6 hours prn  Qty: 120    Date of last visit: 3/28/19    Date of last refill: 19    NATALIE up to date?: yes    Next Follow up?: 19    Any new pertinent information? (i.e, new medication allergies, new use of medications, change in patient's health or condition, non-compliance or inconsistency with prescribing agreement?):

## 2019-05-20 ENCOUNTER — TELEPHONE (OUTPATIENT)
Dept: PAIN MEDICINE | Facility: CLINIC | Age: 54
End: 2019-05-20

## 2019-05-28 ENCOUNTER — RESULTS ENCOUNTER (OUTPATIENT)
Dept: PAIN MEDICINE | Facility: CLINIC | Age: 54
End: 2019-05-28

## 2019-05-28 ENCOUNTER — OFFICE VISIT (OUTPATIENT)
Dept: PAIN MEDICINE | Facility: CLINIC | Age: 54
End: 2019-05-28

## 2019-05-28 VITALS
HEART RATE: 87 BPM | TEMPERATURE: 98.2 F | RESPIRATION RATE: 16 BRPM | BODY MASS INDEX: 46.65 KG/M2 | HEIGHT: 69 IN | OXYGEN SATURATION: 90 % | WEIGHT: 315 LBS

## 2019-05-28 DIAGNOSIS — Z79.899 ENCOUNTER FOR LONG-TERM (CURRENT) USE OF HIGH-RISK MEDICATION: ICD-10-CM

## 2019-05-28 DIAGNOSIS — G89.29 OTHER CHRONIC PAIN: ICD-10-CM

## 2019-05-28 DIAGNOSIS — M17.0 OSTEOARTHRITIS OF BOTH KNEES, UNSPECIFIED OSTEOARTHRITIS TYPE: ICD-10-CM

## 2019-05-28 DIAGNOSIS — M51.36 DDD (DEGENERATIVE DISC DISEASE), LUMBAR: ICD-10-CM

## 2019-05-28 DIAGNOSIS — G89.29 OTHER CHRONIC PAIN: Primary | ICD-10-CM

## 2019-05-28 LAB
POC AMPHETAMINES: NEGATIVE
POC BARBITURATES: NEGATIVE
POC BENZODIAZEPHINES: NEGATIVE
POC COCAINE: NEGATIVE
POC METHADONE: NEGATIVE
POC METHAMPHETAMINE SCREEN URINE: NEGATIVE
POC OPIATES: POSITIVE
POC OXYCODONE: POSITIVE
POC PHENCYCLIDINE: NEGATIVE
POC PROPOXYPHENE: NEGATIVE
POC THC: NEGATIVE
POC TRICYCLIC ANTIDEPRESSANTS: NEGATIVE

## 2019-05-28 PROCEDURE — 99214 OFFICE O/P EST MOD 30 MIN: CPT | Performed by: NURSE PRACTITIONER

## 2019-05-28 PROCEDURE — 80305 DRUG TEST PRSMV DIR OPT OBS: CPT | Performed by: NURSE PRACTITIONER

## 2019-05-28 RX ORDER — CLOTRIMAZOLE AND BETAMETHASONE DIPROPIONATE 10; .64 MG/G; MG/G
CREAM TOPICAL
Refills: 3 | COMMUNITY
Start: 2019-04-29

## 2019-05-28 RX ORDER — AMOXICILLIN 500 MG/1
CAPSULE ORAL
Refills: 4 | COMMUNITY
Start: 2019-05-07 | End: 2019-10-10

## 2019-05-28 RX ORDER — CEPHALEXIN 250 MG/1
CAPSULE ORAL
Refills: 0 | COMMUNITY
Start: 2019-05-21

## 2019-05-28 RX ORDER — CEFTRIAXONE 2 G/1
INJECTION, POWDER, FOR SOLUTION INTRAMUSCULAR; INTRAVENOUS
COMMUNITY
Start: 2019-05-23

## 2019-05-28 NOTE — PROGRESS NOTES
CHIEF COMPLAINT  Pt is here to f/u on LBP and Right knee. Pt sts the pain is the same.    Subjective    Artie Cameron is a 54 y.o. male  who presents to the office for follow-up.He has a history of chronic back and knee pain. Reports his pain is unchanged since last office visit.    He was recently in hospital for sepsis. Now has PICC line and receives antibiotics. After June 25th, he will be changed to oral antibiotic indefinitely.  Also reports he has stitches in his gums.     Complains of pain in his low back, knee and joints. (reports his gout has been acting up in his hands). Today his pain is 4/10VAS. Describes the pain as continuous aching and throbbing. Continues with Hydrocodone 10/325 4/day, gabapentin 300 mg BID and Tizanidine 4 mg 2-3/day. Denies any side effects from the regimen except for dry mouth with the Tizanidine. The regimen helps decrease his pain by 50%. ADL's by self. Is staying by self. Also having home health weekly to change PICC dressing. Also daughter has FMLA to help with antibiotics.     Knee Pain    Incident onset: chronic. The pain is present in the left knee and right knee. The quality of the pain is described as aching. The pain is moderate. The pain has been worsening since onset. Associated symptoms include an inability to bear weight, a loss of motion and numbness (feet bilat.). The symptoms are aggravated by movement and weight bearing. He has tried rest (norco) for the symptoms. The treatment provided moderate relief.   Back Pain   This is a new problem. The problem occurs intermittently. The problem has been gradually worsening (unchanged from last office visit) since onset. The pain is present in the lumbar spine. The quality of the pain is described as aching and burning. The pain is moderate. Associated symptoms include numbness (feet bilat.). Pertinent negatives include no abdominal pain, bladder incontinence, bowel incontinence, chest pain, dysuria, fever, headaches or  "weakness. Risk factors include lack of exercise, obesity and sedentary lifestyle. He has tried analgesics and heat (gabapentin) for the symptoms. The treatment provided moderate relief.      PEG Assessment   What number best describes your pain on average in the past week?6  What number best describes how, during the past week, pain has interfered with your enjoyment of life?6  What number best describes how, during the past week, pain has interfered with your general activity?  6    The following portions of the patient's history were reviewed and updated as appropriate: allergies, current medications, past family history, past medical history, past social history, past surgical history and problem list.    Review of Systems   Constitutional: Negative for fever.   HENT: Negative for congestion.    Respiratory: Negative for shortness of breath.    Cardiovascular: Negative for chest pain.   Gastrointestinal: Negative for abdominal pain and bowel incontinence.   Genitourinary: Negative for bladder incontinence, difficulty urinating and dysuria.   Musculoskeletal: Positive for back pain. Negative for neck stiffness.   Neurological: Positive for numbness (feet bilat.). Negative for dizziness, weakness and headaches.   Psychiatric/Behavioral: Negative for suicidal ideas.       Vitals:    05/28/19 0743   BP: Comment: Can not get a blood pressure   Pulse: 87   Resp: 16   Temp: 98.2 °F (36.8 °C)   SpO2: 90%   Weight: (!) 145 kg (319 lb)   Height: 175.3 cm (69.02\")   PainSc: 0-No pain     Objective   Physical Exam   Constitutional: He is oriented to person, place, and time. Vital signs are normal. He appears well-developed and well-nourished. He is cooperative.   HENT:   Head: Normocephalic and atraumatic.   Eyes: Conjunctivae and lids are normal.   Cardiovascular:   LVAD   Pulmonary/Chest: Effort normal.   Abdominal:   obese   Musculoskeletal:        Right knee: He exhibits decreased range of motion.        Left knee: He " exhibits decreased range of motion (not as bad as right knee).        Lumbar back: He exhibits decreased range of motion.   Neurological: He is alert and oriented to person, place, and time. Gait (motorized wheelchair) abnormal.   Skin: Skin is warm, dry and intact.   Psychiatric: He has a normal mood and affect. His speech is normal and behavior is normal. Cognition and memory are normal.   Nursing note and vitals reviewed.    Assessment/Plan   Artie was seen today for back pain and knee pain.    Diagnoses and all orders for this visit:    Other chronic pain    Osteoarthritis of both knees, unspecified osteoarthritis type    DDD (degenerative disc disease), lumbar    Encounter for long-term (current) use of high-risk medication      --- Routine UDS in office today as part of monitoring requirements for controlled substances.  The specimen was viewed and the immunoassay result reviewed and is +OPI, +OXY(APPROPRIATE).  This specimen will be sent to [facility] laboratory for confirmation.     --- Continue with regimen. Not due for refill. Patient appears stable with current regimen. No adverse effects. Regarding continuation of opioids, there is no evidence of aberrant behavior or any red flags.  The patient continues with appropriate response to opioid therapy. ADL's remain intact by self.   --- HOld on interventions at this time. Recent sepsis diagnosis and remains on PICC. Continue with other specialists as planned.   --- Follow-up 2 months or sooner if needed       NATALIE OBRIEN    As part of the patient's treatment plan, I am prescribing controlled substances. The patient has been made aware of appropriate use of such medications, including potential risk of somnolence, limited ability to drive and/or work safely, and the potential for dependence or overdose. It has also bee made clear that these medications are for use by this patient only, without concomitant use of alcohol or other substances unless prescribed.      Patient has completed prescribing agreement detailing terms of continued prescribing of controlled substances, including monitoring NATALIE reports, urine drug screening, and pill counts if necessary. The patient is aware that inappropriate use will results in cessation of prescribing such medications.    NATALIE report has been reviewed and scanned into the patient's chart.    As the clinician, I personally reviewed the NATALIE from 5-23-19 while the patient was in the office today.    History and physical exam exhibit continued safe and appropriate use of controlled substances.      EMR Dragon/Transcription disclaimer:   Much of this encounter note is an electronic transcription/translation of spoken language to printed text. The electronic translation of spoken language may permit erroneous, or at times, nonsensical words or phrases to be inadvertently transcribed; Although I have reviewed the note for such errors, some may still exist.

## 2019-06-13 RX ORDER — HYDROCODONE BITARTRATE AND ACETAMINOPHEN 10; 325 MG/1; MG/1
1 TABLET ORAL EVERY 6 HOURS PRN
Qty: 120 TABLET | Refills: 0 | Status: SHIPPED | OUTPATIENT
Start: 2019-06-13 | End: 2019-07-09 | Stop reason: SDUPTHER

## 2019-06-13 NOTE — TELEPHONE ENCOUNTER
Medication Refill Request    Date of phone call: 6/13/2019    Medication being requested:Norco 10-325mg  Sig:Take 1 tablet by mouth every 6 hours as needed for moderate pain.  Qty: 120    Date of last visit:5/28/2019     Date of last refill: 5/2/2019    NATALIE up to date?:yes     Next Follow up?:7/25/2019     Any new pertinent information? (i.e, new medication allergies, new use of medications, change in patient's health or condition, non-compliance or inconsistency with prescribing agreement?):

## 2019-07-09 RX ORDER — HYDROCODONE BITARTRATE AND ACETAMINOPHEN 10; 325 MG/1; MG/1
1 TABLET ORAL EVERY 6 HOURS PRN
Qty: 120 TABLET | Refills: 0 | Status: SHIPPED | OUTPATIENT
Start: 2019-07-09 | End: 2019-08-19 | Stop reason: SDUPTHER

## 2019-07-09 NOTE — TELEPHONE ENCOUNTER
Medication Refill Request    Date of phone call: 2019    Medication being requested:norco  mg  si tablet every 6 hours PRN  Qty: 120    Date of last visit: 2019    Date of last refill: 2019    NATALIE up to date?:yes     Next Follow up?: 2019    Any new pertinent information? (i.e, new medication allergies, new use of medications, change in patient's health or condition, non-compliance or inconsistency with prescribing agreement?):

## 2019-08-01 ENCOUNTER — OFFICE VISIT (OUTPATIENT)
Dept: PAIN MEDICINE | Facility: CLINIC | Age: 54
End: 2019-08-01

## 2019-08-01 VITALS
TEMPERATURE: 98.9 F | RESPIRATION RATE: 20 BRPM | DIASTOLIC BLOOD PRESSURE: 70 MMHG | BODY MASS INDEX: 46.65 KG/M2 | HEIGHT: 69 IN | HEART RATE: 80 BPM | WEIGHT: 315 LBS | SYSTOLIC BLOOD PRESSURE: 105 MMHG

## 2019-08-01 DIAGNOSIS — Z79.899 ENCOUNTER FOR LONG-TERM (CURRENT) USE OF HIGH-RISK MEDICATION: ICD-10-CM

## 2019-08-01 DIAGNOSIS — M51.36 DDD (DEGENERATIVE DISC DISEASE), LUMBAR: ICD-10-CM

## 2019-08-01 DIAGNOSIS — G89.29 OTHER CHRONIC PAIN: Primary | ICD-10-CM

## 2019-08-01 DIAGNOSIS — M17.0 OSTEOARTHRITIS OF BOTH KNEES, UNSPECIFIED OSTEOARTHRITIS TYPE: ICD-10-CM

## 2019-08-01 PROCEDURE — 99214 OFFICE O/P EST MOD 30 MIN: CPT | Performed by: NURSE PRACTITIONER

## 2019-08-01 RX ORDER — CARVEDILOL 12.5 MG/1
25 TABLET ORAL 2 TIMES DAILY
Refills: 3 | COMMUNITY
Start: 2019-06-27

## 2019-08-01 RX ORDER — ALLOPURINOL 100 MG/1
100 TABLET ORAL DAILY
COMMUNITY
Start: 2019-07-30 | End: 2020-07-29

## 2019-08-01 RX ORDER — FERROUS SULFATE 325(65) MG
TABLET ORAL
Refills: 11 | COMMUNITY
Start: 2019-07-29

## 2019-08-01 RX ORDER — COLCHICINE 0.6 MG/1
1.2 TABLET ORAL
COMMUNITY
Start: 2019-06-04 | End: 2020-06-03

## 2019-08-01 RX ORDER — PEN NEEDLE, DIABETIC 32GX 5/32"
NEEDLE, DISPOSABLE MISCELLANEOUS
Refills: 0 | COMMUNITY
Start: 2019-07-22

## 2019-08-01 RX ORDER — POTASSIUM CHLORIDE 20 MEQ/1
20 TABLET, EXTENDED RELEASE ORAL 2 TIMES DAILY
Refills: 3 | COMMUNITY
Start: 2019-07-15

## 2019-08-01 RX ORDER — CEFTRIAXONE SODIUM 10 G/100ML
INJECTION, POWDER, FOR SOLUTION INTRAVENOUS
COMMUNITY
Start: 2019-06-10

## 2019-08-01 RX ORDER — AMOXICILLIN AND CLAVULANATE POTASSIUM 875; 125 MG/1; MG/1
TABLET, FILM COATED ORAL
Refills: 1 | COMMUNITY
Start: 2019-07-24

## 2019-08-01 RX ORDER — IPRATROPIUM BROMIDE AND ALBUTEROL SULFATE 2.5; .5 MG/3ML; MG/3ML
3 SOLUTION RESPIRATORY (INHALATION)
COMMUNITY

## 2019-08-01 NOTE — PROGRESS NOTES
CHIEF COMPLAINT  F/u lbp and right knee pain. Pt sts back pain has remained the same but right knee pain has worsened since last ov.     Subjective   Artie Cameron is a 54 y.o. male  who presents to the office for follow-up.He has a history of chronic back pain and knee pain. Reports his back pain and left knee pain are unchanged but his right knee pain is worse since last office visit.    Is still on IV antibiotics until 8-8-19. Is doing IV antibiotics today. Will be transitioning to oral antibiotics.    Complains of pain in his low back and knees. Today his pain is 6/10VAS. Describes the pain as continuous aching and throbbing. Pain increases with walking, standing, activity, weight-bearing; pain decreases with medication, rest. Continues with Hydrocodone 10/325 4/day, gabapentin 300 mg BID and Tizanidine 4 mg 2-3/day. Denies any side effects from the regimen except for dry mouth with the Tizanidine. The regimen helps decrease his pain by 40%. ADL's by self. Is staying by self. Also having home health weekly to change PICC dressing.  Knee Pain    Incident onset: chronic. The pain is present in the left knee and right knee. The quality of the pain is described as aching. The pain is at a severity of 6/10. The pain is moderate. The pain has been worsening (right knee worse, left knee unchanged) since onset. Associated symptoms include an inability to bear weight, a loss of motion and numbness (feet bilat.). The symptoms are aggravated by movement and weight bearing. He has tried rest (norco) for the symptoms. The treatment provided moderate relief.   Back Pain   This is a new problem. The problem occurs intermittently. The problem has been gradually worsening (unchanged from last office visit) since onset. The pain is present in the lumbar spine. The quality of the pain is described as aching and burning. The pain is at a severity of 6/10. The pain is moderate. Associated symptoms include numbness (feet bilat.).  "Pertinent negatives include no abdominal pain, bladder incontinence, bowel incontinence, chest pain, dysuria, fever, headaches or weakness. Risk factors include lack of exercise, obesity and sedentary lifestyle. He has tried analgesics and heat (gabapentin) for the symptoms. The treatment provided moderate relief.      PEG Assessment   What number best describes your pain on average in the past week?6  What number best describes how, during the past week, pain has interfered with your enjoyment of life?6  What number best describes how, during the past week, pain has interfered with your general activity?  6    The following portions of the patient's history were reviewed and updated as appropriate: allergies, current medications, past family history, past medical history, past social history, past surgical history and problem list.    Review of Systems   Constitutional: Negative for activity change, fatigue and fever.   HENT: Negative for congestion.    Respiratory: Positive for shortness of breath (occ on 2liters O2 at home, and duoneb treatments ).    Cardiovascular: Negative for chest pain.   Gastrointestinal: Negative for abdominal pain, bowel incontinence, constipation and diarrhea.   Genitourinary: Negative for bladder incontinence, difficulty urinating and dysuria.   Musculoskeletal: Positive for arthralgias (RIGHT KNEE), back pain and gait problem (W/C). Negative for neck stiffness.   Neurological: Positive for numbness (feet bilat.). Negative for dizziness, weakness and headaches.   Psychiatric/Behavioral: Negative for agitation, sleep disturbance and suicidal ideas.       Vitals:    08/01/19 1057   BP: 105/70  Comment: bp dopplered with pt equipment   BP Location: Left arm   Patient Position: Sitting   Pulse: 80   Resp: 20   Temp: 98.9 °F (37.2 °C)   SpO2: Comment: unable to obtain sat   Weight: (!) 145 kg (319 lb)  Comment: pt unable to get weighed   Height: 175.3 cm (69.02\")   PainSc:   6   PainLoc: " Back     Objective   Physical Exam   Constitutional: He is oriented to person, place, and time. Vital signs are normal. He appears well-developed and well-nourished. He is cooperative.   HENT:   Head: Normocephalic and atraumatic.   Eyes: Conjunctivae and lids are normal.   Cardiovascular:   LVAD   Pulmonary/Chest: Effort normal.   Abdominal:   obese   Musculoskeletal:        Right knee: He exhibits decreased range of motion.        Left knee: He exhibits decreased range of motion.        Lumbar back: He exhibits decreased range of motion.   Neurological: He is alert and oriented to person, place, and time. Gait (motorized wheelchair) abnormal.   Skin: Skin is warm, dry and intact.   Psychiatric: He has a normal mood and affect. His speech is normal and behavior is normal. Cognition and memory are normal.   Nursing note and vitals reviewed.      Assessment/Plan   Artie was seen today for back pain and knee pain.    Diagnoses and all orders for this visit:    Other chronic pain    DDD (degenerative disc disease), lumbar    Osteoarthritis of both knees, unspecified osteoarthritis type    Encounter for long-term (current) use of high-risk medication      --- The urine drug screen confirmation from 5-28-19 has been reviewed and the result is APPROPRIATE based on patient history and NATALIE report  --- Continue with regimen. Not due for refill. Will call for refills as needed. Patient appears stable with current regimen. No adverse effects. Regarding continuation of opioids, there is no evidence of aberrant behavior or any red flags.  The patient continues with appropriate response to opioid therapy. ADL's remain intact by self.   --- Follow-up 2-3 months or sooner if needed.     NATALIE REPORT    As part of the patient's treatment plan, I am prescribing controlled substances. The patient has been made aware of appropriate use of such medications, including potential risk of somnolence, limited ability to drive and/or work  safely, and the potential for dependence or overdose. It has also bee made clear that these medications are for use by this patient only, without concomitant use of alcohol or other substances unless prescribed.     Patient has completed prescribing agreement detailing terms of continued prescribing of controlled substances, including monitoring NATALIE reports, urine drug screening, and pill counts if necessary. The patient is aware that inappropriate use will results in cessation of prescribing such medications.    NATALIE report has been reviewed and scanned into the patient's chart.    As the clinician, I personally reviewed the NATALIE from 7-30-19 while the patient was in the office today.    History and physical exam exhibit continued safe and appropriate use of controlled substances.      EMR Dragon/Transcription disclaimer:   Much of this encounter note is an electronic transcription/translation of spoken language to printed text. The electronic translation of spoken language may permit erroneous, or at times, nonsensical words or phrases to be inadvertently transcribed; Although I have reviewed the note for such errors, some may still exist.

## 2019-08-12 RX ORDER — GABAPENTIN 300 MG/1
CAPSULE ORAL
Qty: 120 CAPSULE | Refills: 3 | Status: SHIPPED | OUTPATIENT
Start: 2019-08-12 | End: 2019-10-10 | Stop reason: SDUPTHER

## 2019-08-19 RX ORDER — HYDROCODONE BITARTRATE AND ACETAMINOPHEN 10; 325 MG/1; MG/1
1 TABLET ORAL EVERY 6 HOURS PRN
Qty: 120 TABLET | Refills: 0 | Status: SHIPPED | OUTPATIENT
Start: 2019-08-19 | End: 2019-09-12 | Stop reason: SDUPTHER

## 2019-08-19 NOTE — TELEPHONE ENCOUNTER
Medication Refill Request    Date of phone call: 19    Medication being requested: norco 10/325mg  si tab po q 6 hours prn   Qty: 120    Date of last visit: 19    Date of last refill: 19    NATALIE up to date?: yes    Next Follow up?: 10/10/19    Any new pertinent information? (i.e, new medication allergies, new use of medications, change in patient's health or condition, non-compliance or inconsistency with prescribing agreement?):

## 2019-09-12 RX ORDER — HYDROCODONE BITARTRATE AND ACETAMINOPHEN 10; 325 MG/1; MG/1
1 TABLET ORAL EVERY 6 HOURS PRN
Qty: 120 TABLET | Refills: 0 | Status: SHIPPED | OUTPATIENT
Start: 2019-09-12 | End: 2019-10-10 | Stop reason: SDUPTHER

## 2019-09-12 NOTE — TELEPHONE ENCOUNTER
Medication Refill Request    Date of phone call: 19    Medication being requested: Norco  mg    si tab po q 6 hrs prn  Qty: 120    Date of last visit: 19    Date of last refill: 19    NATALIE up to date?: yes    Next Follow up?: 10/10/19    Any new pertinent information? (i.e, new medication allergies, new use of medications, change in patient's health or condition, non-compliance or inconsistency with prescribing agreement?):

## 2019-10-10 ENCOUNTER — OFFICE VISIT (OUTPATIENT)
Dept: PAIN MEDICINE | Facility: CLINIC | Age: 54
End: 2019-10-10

## 2019-10-10 VITALS
RESPIRATION RATE: 16 BRPM | WEIGHT: 315 LBS | BODY MASS INDEX: 46.65 KG/M2 | TEMPERATURE: 97.8 F | HEIGHT: 69 IN | HEART RATE: 65 BPM | OXYGEN SATURATION: 94 %

## 2019-10-10 DIAGNOSIS — M51.36 DDD (DEGENERATIVE DISC DISEASE), LUMBAR: ICD-10-CM

## 2019-10-10 DIAGNOSIS — M17.0 OSTEOARTHRITIS OF BOTH KNEES, UNSPECIFIED OSTEOARTHRITIS TYPE: ICD-10-CM

## 2019-10-10 DIAGNOSIS — G89.29 OTHER CHRONIC PAIN: Primary | ICD-10-CM

## 2019-10-10 DIAGNOSIS — Z79.899 ENCOUNTER FOR LONG-TERM (CURRENT) USE OF HIGH-RISK MEDICATION: ICD-10-CM

## 2019-10-10 PROCEDURE — 99214 OFFICE O/P EST MOD 30 MIN: CPT | Performed by: NURSE PRACTITIONER

## 2019-10-10 RX ORDER — GABAPENTIN 300 MG/1
300 CAPSULE ORAL 4 TIMES DAILY
Qty: 120 CAPSULE | Refills: 3 | Status: SHIPPED | OUTPATIENT
Start: 2019-10-10 | End: 2020-04-14

## 2019-10-10 RX ORDER — HYDRALAZINE HYDROCHLORIDE 25 MG/1
25 TABLET, FILM COATED ORAL 2 TIMES DAILY
Refills: 1 | COMMUNITY
Start: 2019-10-01

## 2019-10-10 RX ORDER — HYDROCODONE BITARTRATE AND ACETAMINOPHEN 10; 325 MG/1; MG/1
1 TABLET ORAL EVERY 6 HOURS PRN
Qty: 120 TABLET | Refills: 0 | Status: SHIPPED | OUTPATIENT
Start: 2019-10-10 | End: 2019-11-07 | Stop reason: SDUPTHER

## 2019-10-10 NOTE — PROGRESS NOTES
CHIEF COMPLAINT  F/U back and knee pain- patient states that his pain has remained the same.     Subjective   Artie Cameron is a 54 y.o. male  who presents to the office for follow-up.He has a history of chronic back and knee pain. Reports his pain pattern is relatively unchanged.    Complains of pain in his low back and knees. Today his pain is 5/10VAS. Describes the pain as continuous aching and throbbing and intermittent sharp pain. Is having some issues with his gout, having hand pain and swelling, as well as great toe pain. Continues with Hydrocodone 10/325 4/day, gabapentin 300 mg BID and Tizanidine 4 mg 2-3/day. Denies any side effects from the regimen except for dry mouth with the Tizanidine. The regimen helps decrease his pain by 40%. ADL's by self.    NO longer has PICC line. Not needing any more IV antibiotics. Is taking Augmentin and will be on this indefinitely.   Knee Pain    Incident onset: chronic. The pain is present in the left knee and right knee. The quality of the pain is described as aching. The pain is at a severity of 5/10. The pain is moderate. The pain has been worsening (right knee worse, left knee unchanged) since onset. Associated symptoms include an inability to bear weight, a loss of motion and numbness (feet bilat.). The symptoms are aggravated by movement and weight bearing. He has tried rest (norco) for the symptoms. The treatment provided moderate relief.   Back Pain   This is a new problem. The problem occurs intermittently. The problem has been gradually worsening (unchanged from last office visit) since onset. The pain is present in the lumbar spine. The quality of the pain is described as aching and burning. The pain is at a severity of 5/10. The pain is moderate. Associated symptoms include numbness (feet bilat.). Pertinent negatives include no abdominal pain, bladder incontinence, bowel incontinence, chest pain, dysuria, fever, headaches or weakness. Risk factors include lack  "of exercise, obesity and sedentary lifestyle. He has tried analgesics and heat (gabapentin) for the symptoms. The treatment provided moderate relief.      PEG Assessment   What number best describes your pain on average in the past week?5  What number best describes how, during the past week, pain has interfered with your enjoyment of life?6  What number best describes how, during the past week, pain has interfered with your general activity?  6    The following portions of the patient's history were reviewed and updated as appropriate: allergies, current medications, past family history, past medical history, past social history, past surgical history and problem list.    Review of Systems   Constitutional: Negative for activity change, fatigue and fever.   HENT: Negative for congestion.    Respiratory: Positive for shortness of breath (occ on 2liters O2 at home, and duoneb treatments ).    Cardiovascular: Negative for chest pain.   Gastrointestinal: Negative for abdominal pain, bowel incontinence, constipation and diarrhea.   Genitourinary: Negative for bladder incontinence, difficulty urinating and dysuria.   Musculoskeletal: Positive for arthralgias (RIGHT KNEE), back pain and gait problem (W/C). Negative for neck stiffness.   Neurological: Positive for numbness (feet bilat.). Negative for dizziness, weakness and headaches.   Psychiatric/Behavioral: Negative for agitation, sleep disturbance and suicidal ideas.       Vitals:    10/10/19 0741   Pulse: 65   Resp: 16   Temp: 97.8 °F (36.6 °C)   SpO2: 94%   Weight: (!) 148 kg (327 lb)   Height: 175.3 cm (69\")   PainSc:   5   PainLoc: Back     Objective   Physical Exam   Constitutional: He is oriented to person, place, and time. Vital signs are normal. He appears well-developed and well-nourished. He is cooperative.   HENT:   Head: Normocephalic and atraumatic.   Eyes: Conjunctivae and lids are normal.   Cardiovascular:   LVAD   Pulmonary/Chest: Effort normal. "   Abdominal:   obese   Musculoskeletal:        Right knee: He exhibits decreased range of motion.        Left knee: He exhibits decreased range of motion.        Lumbar back: He exhibits decreased range of motion.   Neurological: He is alert and oriented to person, place, and time. Gait (motorized wheelchair) abnormal.   Skin: Skin is warm, dry and intact.   Psychiatric: He has a normal mood and affect. His speech is normal and behavior is normal. Cognition and memory are normal.   Nursing note and vitals reviewed.    Assessment/Plan   Artie was seen today for back pain.    Diagnoses and all orders for this visit:    Other chronic pain    DDD (degenerative disc disease), lumbar    Osteoarthritis of both knees, unspecified osteoarthritis type    Encounter for long-term (current) use of high-risk medication    Other orders  -     HYDROcodone-acetaminophen (NORCO)  MG per tablet; Take 1 tablet by mouth Every 6 (Six) Hours As Needed for Moderate Pain . DNF until 10-18-19  -     gabapentin (NEURONTIN) 300 MG capsule; Take 1 capsule by mouth 4 (Four) Times a Day.      --- The urine drug screen confirmation from 5-28-19 has been reviewed and the result is APPROPRIATE based on patient history and NATALIE report  --- Refill Hydrocodone DNF until 10-18-19. Patient appears stable with current regimen. No adverse effects. Regarding continuation of opioids, there is no evidence of aberrant behavior or any red flags.  The patient continues with appropriate response to opioid therapy. ADL's remain intact by self.   --- Follow-up 2 months or sooner if needed.     NATALIE REPORT    As part of the patient's treatment plan, I am prescribing controlled substances. The patient has been made aware of appropriate use of such medications, including potential risk of somnolence, limited ability to drive and/or work safely, and the potential for dependence or overdose. It has also bee made clear that these medications are for use by this  patient only, without concomitant use of alcohol or other substances unless prescribed.     Patient has completed prescribing agreement detailing terms of continued prescribing of controlled substances, including monitoring NATALIE reports, urine drug screening, and pill counts if necessary. The patient is aware that inappropriate use will results in cessation of prescribing such medications.    NATALIE report has been reviewed and scanned into the patient's chart.    As the clinician, I personally reviewed the NATALIE from 9-12-19 while the patient was in the office today.    History and physical exam exhibit continued safe and appropriate use of controlled substances.      EMR Dragon/Transcription disclaimer:   Much of this encounter note is an electronic transcription/translation of spoken language to printed text. The electronic translation of spoken language may permit erroneous, or at times, nonsensical words or phrases to be inadvertently transcribed; Although I have reviewed the note for such errors, some may still exist.

## 2019-11-07 ENCOUNTER — RESULTS ENCOUNTER (OUTPATIENT)
Dept: PAIN MEDICINE | Facility: CLINIC | Age: 54
End: 2019-11-07

## 2019-11-07 ENCOUNTER — OFFICE VISIT (OUTPATIENT)
Dept: PAIN MEDICINE | Facility: CLINIC | Age: 54
End: 2019-11-07

## 2019-11-07 VITALS
BODY MASS INDEX: 46.65 KG/M2 | WEIGHT: 315 LBS | TEMPERATURE: 97.7 F | HEART RATE: 68 BPM | HEIGHT: 69 IN | RESPIRATION RATE: 16 BRPM | OXYGEN SATURATION: 97 %

## 2019-11-07 DIAGNOSIS — G89.29 OTHER CHRONIC PAIN: ICD-10-CM

## 2019-11-07 DIAGNOSIS — M54.16 LUMBAR RADICULOPATHY: ICD-10-CM

## 2019-11-07 DIAGNOSIS — G89.29 OTHER CHRONIC PAIN: Primary | ICD-10-CM

## 2019-11-07 DIAGNOSIS — M51.36 DDD (DEGENERATIVE DISC DISEASE), LUMBAR: ICD-10-CM

## 2019-11-07 DIAGNOSIS — Z79.899 ENCOUNTER FOR LONG-TERM (CURRENT) USE OF HIGH-RISK MEDICATION: ICD-10-CM

## 2019-11-07 DIAGNOSIS — M17.0 OSTEOARTHRITIS OF BOTH KNEES, UNSPECIFIED OSTEOARTHRITIS TYPE: ICD-10-CM

## 2019-11-07 PROCEDURE — 80305 DRUG TEST PRSMV DIR OPT OBS: CPT | Performed by: NURSE PRACTITIONER

## 2019-11-07 PROCEDURE — 99214 OFFICE O/P EST MOD 30 MIN: CPT | Performed by: NURSE PRACTITIONER

## 2019-11-07 RX ORDER — HYDROCODONE BITARTRATE AND ACETAMINOPHEN 10; 325 MG/1; MG/1
1 TABLET ORAL EVERY 6 HOURS PRN
Qty: 120 TABLET | Refills: 0 | Status: SHIPPED | OUTPATIENT
Start: 2019-11-07 | End: 2019-12-11 | Stop reason: SDUPTHER

## 2019-12-11 NOTE — TELEPHONE ENCOUNTER
Medication Refill Request    Date of phone call: 19    Medication being requested: norco 10/325mg si tab po q 6 hours prn   Qty: 120    Date of last visit: 19    Date of last refill: 19    NATALIE up to date?: yes    Next Follow up?: 20    Any new pertinent information? (i.e, new medication allergies, new use of medications, change in patient's health or condition, non-compliance or inconsistency with prescribing agreement?):

## 2019-12-12 RX ORDER — HYDROCODONE BITARTRATE AND ACETAMINOPHEN 10; 325 MG/1; MG/1
1 TABLET ORAL EVERY 6 HOURS PRN
Qty: 120 TABLET | Refills: 0 | Status: SHIPPED | OUTPATIENT
Start: 2019-12-12 | End: 2020-01-09 | Stop reason: SDUPTHER

## 2020-01-09 ENCOUNTER — RESULTS ENCOUNTER (OUTPATIENT)
Dept: PAIN MEDICINE | Facility: CLINIC | Age: 55
End: 2020-01-09

## 2020-01-09 ENCOUNTER — OFFICE VISIT (OUTPATIENT)
Dept: PAIN MEDICINE | Facility: CLINIC | Age: 55
End: 2020-01-09

## 2020-01-09 VITALS
HEART RATE: 65 BPM | TEMPERATURE: 97.8 F | WEIGHT: 315 LBS | OXYGEN SATURATION: 96 % | HEIGHT: 69 IN | RESPIRATION RATE: 18 BRPM | BODY MASS INDEX: 46.65 KG/M2

## 2020-01-09 DIAGNOSIS — M17.0 OSTEOARTHRITIS OF BOTH KNEES, UNSPECIFIED OSTEOARTHRITIS TYPE: ICD-10-CM

## 2020-01-09 DIAGNOSIS — G89.29 OTHER CHRONIC PAIN: Primary | ICD-10-CM

## 2020-01-09 DIAGNOSIS — G89.29 OTHER CHRONIC PAIN: ICD-10-CM

## 2020-01-09 DIAGNOSIS — Z79.899 ENCOUNTER FOR LONG-TERM (CURRENT) USE OF HIGH-RISK MEDICATION: ICD-10-CM

## 2020-01-09 DIAGNOSIS — M51.36 DDD (DEGENERATIVE DISC DISEASE), LUMBAR: ICD-10-CM

## 2020-01-09 PROCEDURE — 99214 OFFICE O/P EST MOD 30 MIN: CPT | Performed by: NURSE PRACTITIONER

## 2020-01-09 PROCEDURE — 80305 DRUG TEST PRSMV DIR OPT OBS: CPT | Performed by: NURSE PRACTITIONER

## 2020-01-09 RX ORDER — HYDROCODONE BITARTRATE AND ACETAMINOPHEN 10; 325 MG/1; MG/1
1 TABLET ORAL EVERY 6 HOURS PRN
Qty: 120 TABLET | Refills: 0 | Status: SHIPPED | OUTPATIENT
Start: 2020-01-09 | End: 2020-02-11 | Stop reason: SDUPTHER

## 2020-01-09 NOTE — PROGRESS NOTES
CHIEF COMPLAINT  F/U back and knee pain- patient states that his pain has remained the same since his last visit.     Subjective   Artie Cameron is a 54 y.o. male  who presents to the office for follow-up.He has a history of back pain and knee pain.    C/o back and knee pain. Pain today 6/10 in severity.  Continues with Hydrocodone 10/325 4/day and gabapentin 600 mg 3/day. Reports moderate reduction in pain with this regimen. Denies any side effects from the regimen. This helps decrease his pain moderately. ADL's by self.     Knee Pain    Incident onset: chronic. The pain is present in the left knee and right knee. The quality of the pain is described as aching. The pain is at a severity of 6/10. The pain is severe. The pain has been worsening since onset. Associated symptoms include an inability to bear weight, a loss of motion and numbness (feet bilat.). The symptoms are aggravated by movement and weight bearing. He has tried rest (norco) for the symptoms. The treatment provided moderate relief.   Back Pain   This is a new problem. The problem occurs intermittently. The problem has been gradually worsening (unchanged from last office visit) since onset. The pain is present in the lumbar spine. The quality of the pain is described as aching and burning. The pain is at a severity of 6/10. The pain is moderate. Associated symptoms include headaches, numbness (feet bilat.) and weakness. Pertinent negatives include no abdominal pain, bladder incontinence, bowel incontinence, chest pain, dysuria or fever. Risk factors include lack of exercise, obesity and sedentary lifestyle. He has tried analgesics and heat (gabapentin) for the symptoms. The treatment provided moderate relief.     PEG Assessment   What number best describes your pain on average in the past week?6  What number best describes how, during the past week, pain has interfered with your enjoyment of life?8  What number best describes how, during the past week,  "pain has interfered with your general activity?  8    The following portions of the patient's history were reviewed and updated as appropriate: allergies, current medications, past family history, past medical history, past social history, past surgical history and problem list.    Review of Systems   Constitutional: Positive for fatigue (sometimes). Negative for activity change and fever.   HENT: Negative for congestion.    Respiratory: Positive for shortness of breath (occ on 2liters O2 at home, and duoneb treatments ) and wheezing. Negative for chest tightness.    Cardiovascular: Negative for chest pain.   Gastrointestinal: Negative for abdominal pain, bowel incontinence, constipation and diarrhea.   Genitourinary: Negative for bladder incontinence, difficulty urinating and dysuria.   Musculoskeletal: Positive for arthralgias (RIGHT KNEE), back pain and gait problem (W/C). Negative for neck stiffness.   Neurological: Positive for dizziness, weakness, light-headedness, numbness (feet bilat.) and headaches.   Psychiatric/Behavioral: Positive for sleep disturbance. Negative for agitation and suicidal ideas. The patient is not nervous/anxious.      Vitals:    01/09/20 0728   Pulse: 65   Resp: 18   Temp: 97.8 °F (36.6 °C)   SpO2: 96%   Weight: (!) 152 kg (335 lb 6.4 oz)   Height: 175.3 cm (69\")   PainSc:   6   PainLoc: Back     Objective   Physical Exam   Constitutional: He is oriented to person, place, and time. He appears well-developed and well-nourished. No distress.   HENT:   Head: Normocephalic and atraumatic.   Eyes: Conjunctivae and EOM are normal.   Neck: Neck supple.   Cardiovascular: Normal rate.   Pulmonary/Chest: Effort normal. No respiratory distress.   Musculoskeletal:        Right knee: Tenderness found.        Left knee: Tenderness found.        Lumbar back: He exhibits tenderness.   Neurological: He is alert and oriented to person, place, and time. He has normal strength. No sensory deficit. Gait " (wheelchair) abnormal.   Skin: Skin is warm and dry. He is not diaphoretic.   Psychiatric: He has a normal mood and affect. His behavior is normal.   Nursing note and vitals reviewed.    Assessment/Plan   Artie was seen today for back pain.    Diagnoses and all orders for this visit:    Other chronic pain    DDD (degenerative disc disease), lumbar    Osteoarthritis of both knees, unspecified osteoarthritis type    Encounter for long-term (current) use of high-risk medication    Other orders  -     HYDROcodone-acetaminophen (NORCO)  MG per tablet; Take 1 tablet by mouth Every 6 (Six) Hours As Needed for Moderate Pain . DNF until 1-      --- Refill Hydrocodone. Patient appears stable with current regimen. No adverse effects. Regarding continuation of opioids, there is no evidence of aberrant behavior or any red flags.  The patient continues with appropriate response to opioid therapy. ADL's remain intact by self.   --- Routine UDS in office today as part of monitoring requirements for controlled substances.  The specimen was viewed and the immunoassay result reviewed and is +OPI.  This specimen will be sent to [facility] for confirmation.     --- Follow-up 2 months        NATALIE REPORT  As part of the patient's treatment plan, I am prescribing controlled substances. The patient has been made aware of appropriate use of such medications, including potential risk of somnolence, limited ability to drive and/or work safely, and the potential for dependence or overdose. It has also bee made clear that these medications are for use by this patient only, without concomitant use of alcohol or other substances unless prescribed.     Patient has completed prescribing agreement detailing terms of continued prescribing of controlled substances, including monitoring NATALIE reports, urine drug screening, and pill counts if necessary. The patient is aware that inappropriate use will results in cessation of  prescribing such medications.    NATALIE report has been reviewed and scanned into the patient's chart.    As the clinician, I personally reviewed the NATALIE from 1/9/2020 while the patient was in the office today.    History and physical exam exhibit continued safe and appropriate use of controlled substances.    EMR Dragon/Transcription disclaimer:   Much of this encounter note is an electronic transcription/translation of spoken language to printed text. The electronic translation of spoken language may permit erroneous, or at times, nonsensical words or phrases to be inadvertently transcribed; Although I have reviewed the note for such errors, some may still exist.

## 2020-02-11 RX ORDER — HYDROCODONE BITARTRATE AND ACETAMINOPHEN 10; 325 MG/1; MG/1
1 TABLET ORAL EVERY 6 HOURS PRN
Qty: 120 TABLET | Refills: 0 | Status: SHIPPED | OUTPATIENT
Start: 2020-02-11 | End: 2020-03-10 | Stop reason: SDUPTHER

## 2020-02-11 NOTE — TELEPHONE ENCOUNTER
Reviewed UDS and NATALIE. Both updated and appropriate. Refill appropriate.    Reviewed chart. Appropriate for refill. Since JULIAN is out of office, will refill. Thanks. jerzy

## 2020-02-11 NOTE — TELEPHONE ENCOUNTER
Medication Refill Request    Date of phone call: 20    Medication being requested: norco 10/325mg si tab po q 6 hours prn   Qty: 120    Date of last visit: 20    Date of last refill: 20    NATALIE up to date?: yes    Next Follow up?: 3/9/20    Any new pertinent information? (i.e, new medication allergies, new use of medications, change in patient's health or condition, non-compliance or inconsistency with prescribing agreement?):

## 2020-03-10 ENCOUNTER — OFFICE VISIT (OUTPATIENT)
Dept: PAIN MEDICINE | Facility: CLINIC | Age: 55
End: 2020-03-10

## 2020-03-10 VITALS
OXYGEN SATURATION: 91 % | RESPIRATION RATE: 20 BRPM | BODY MASS INDEX: 46.65 KG/M2 | TEMPERATURE: 97.8 F | HEIGHT: 69 IN | WEIGHT: 315 LBS

## 2020-03-10 DIAGNOSIS — M51.36 DDD (DEGENERATIVE DISC DISEASE), LUMBAR: ICD-10-CM

## 2020-03-10 DIAGNOSIS — Z79.899 ENCOUNTER FOR LONG-TERM (CURRENT) USE OF HIGH-RISK MEDICATION: Primary | ICD-10-CM

## 2020-03-10 DIAGNOSIS — M54.16 LUMBAR RADICULOPATHY: ICD-10-CM

## 2020-03-10 DIAGNOSIS — M17.0 OSTEOARTHRITIS OF BOTH KNEES, UNSPECIFIED OSTEOARTHRITIS TYPE: ICD-10-CM

## 2020-03-10 DIAGNOSIS — G89.29 OTHER CHRONIC PAIN: ICD-10-CM

## 2020-03-10 PROCEDURE — 99214 OFFICE O/P EST MOD 30 MIN: CPT | Performed by: NURSE PRACTITIONER

## 2020-03-10 RX ORDER — CLOTRIMAZOLE AND BETAMETHASONE DIPROPIONATE 10; .64 MG/G; MG/G
CREAM TOPICAL
COMMUNITY
Start: 2019-04-29

## 2020-03-10 RX ORDER — LANOLIN ALCOHOL/MO/W.PET/CERES
CREAM (GRAM) TOPICAL
COMMUNITY

## 2020-03-10 RX ORDER — FLUTICASONE PROPIONATE 50 MCG
1 SPRAY, SUSPENSION (ML) NASAL DAILY
COMMUNITY
Start: 2020-03-06

## 2020-03-10 RX ORDER — ROSUVASTATIN CALCIUM 10 MG/1
TABLET, COATED ORAL DAILY
COMMUNITY
Start: 2020-03-05

## 2020-03-10 RX ORDER — HYDROCODONE BITARTRATE AND ACETAMINOPHEN 10; 325 MG/1; MG/1
1 TABLET ORAL EVERY 6 HOURS PRN
Qty: 120 TABLET | Refills: 0 | Status: SHIPPED | OUTPATIENT
Start: 2020-03-10 | End: 2020-04-09 | Stop reason: SDUPTHER

## 2020-03-10 RX ORDER — NYSTATIN 100000 [USP'U]/G
POWDER TOPICAL
COMMUNITY
Start: 2019-09-12 | End: 2020-09-11

## 2020-03-10 RX ORDER — AMOXICILLIN AND CLAVULANATE POTASSIUM 875; 125 MG/1; MG/1
TABLET, FILM COATED ORAL
COMMUNITY
Start: 2019-07-24

## 2020-03-10 RX ORDER — COLCHICINE 0.6 MG/1
1.2 TABLET ORAL
COMMUNITY
Start: 2019-12-04 | End: 2020-12-03

## 2020-03-10 RX ORDER — ERGOCALCIFEROL 1.25 MG/1
CAPSULE ORAL
COMMUNITY
Start: 2020-01-09

## 2020-03-10 RX ORDER — ALLOPURINOL 100 MG/1
100 TABLET ORAL DAILY
COMMUNITY
Start: 2019-12-20 | End: 2020-12-19

## 2020-03-10 RX ORDER — LORATADINE 10 MG/1
TABLET ORAL DAILY
COMMUNITY
Start: 2020-03-06

## 2020-03-10 RX ORDER — FLUOCINOLONE ACETONIDE 0.11 MG/ML
OIL TOPICAL
COMMUNITY
Start: 2016-03-28

## 2020-03-10 RX ORDER — CARVEDILOL 6.25 MG/1
TABLET ORAL
COMMUNITY
Start: 2020-01-22

## 2020-03-10 RX ORDER — FORMOTEROL FUMARATE 20 UG/2ML
SOLUTION RESPIRATORY (INHALATION)
COMMUNITY

## 2020-03-10 RX ORDER — WARFARIN SODIUM 5 MG/1
5 TABLET ORAL DAILY
COMMUNITY

## 2020-03-10 RX ORDER — AMIODARONE HYDROCHLORIDE 200 MG/1
TABLET ORAL
COMMUNITY
Start: 2019-07-29

## 2020-03-10 RX ORDER — POTASSIUM CHLORIDE 20 MEQ/1
TABLET, EXTENDED RELEASE ORAL
COMMUNITY
Start: 2019-07-15

## 2020-03-10 NOTE — TELEPHONE ENCOUNTER
September 30, 2018      To Whom It May Concern:      Katelyn Hollingsworth was seen in our Emergency Department today, 09/30/18.  I expect her condition to improve over the next 2-3 days.  She may return to work when improved.    Sincerely,        Chase Salcedo RN         Seen today. DNF 3/15/20 applied. Thanks TT

## 2020-03-10 NOTE — PROGRESS NOTES
CHIEF COMPLAINT  F/u back and knee pain. Pt sts pain is the same.     Initial evaluation by SANTOSH Rock   Artie Cameron is a 55 y.o. male  who presents to the office for follow-up.He has a history of back and bilateral knee pain.  Today  His pain is 6/10VAS in severity.  Continues with Hydrocodone 10/325 4/day, Tizanidine 4mg 2/day, and gabapentin 600 mg 2/day.  This medication regimen decreases his pain a moderate amount.  He denies any side effects from his medication regimen including somnolence or constipation.  ADLs by self. He denies any changes to bladder since last office visit, he states he had black stool recently, his PCP removed him from his iron medication and this resolved.      Knee Pain    Incident onset: chronic. The pain is present in the left knee and right knee. The quality of the pain is described as aching. The pain is at a severity of 6/10. The pain has been worsening since onset. Associated symptoms include an inability to bear weight, a loss of motion and numbness (feet bilat.). The symptoms are aggravated by movement and weight bearing. He has tried rest (norco) for the symptoms. The treatment provided moderate relief.   Back Pain   This is a chronic problem. The problem occurs intermittently. The problem is unchanged. The pain is present in the lumbar spine. The quality of the pain is described as aching and burning. The pain is at a severity of 6/10. The pain is moderate. Associated symptoms include headaches, numbness (feet bilat.) and weakness. Pertinent negatives include no abdominal pain, bladder incontinence, bowel incontinence, chest pain, dysuria or fever. Risk factors include lack of exercise, obesity and sedentary lifestyle. He has tried analgesics and heat (gabapentin) for the symptoms. The treatment provided moderate relief.      PEG Assessment   What number best describes your pain on average in the past week?5  What number best describes how, during the past  "week, pain has interfered with your enjoyment of life?6  What number best describes how, during the past week, pain has interfered with your general activity?  5    The following portions of the patient's history were reviewed and updated as appropriate: allergies, current medications, past family history, past medical history, past social history, past surgical history and problem list.    Review of Systems   Constitutional: Positive for fatigue (sometimes). Negative for activity change and fever.   HENT: Negative for congestion.    Respiratory: Positive for shortness of breath (occ on 2liters O2 at home, and duoneb treatments ). Negative for chest tightness and wheezing.    Cardiovascular: Negative for chest pain.   Gastrointestinal: Negative for abdominal pain, bowel incontinence, constipation and diarrhea.   Genitourinary: Negative for bladder incontinence, difficulty urinating and dysuria.   Musculoskeletal: Positive for arthralgias (RIGHT KNEE), back pain and gait problem (W/C). Negative for neck stiffness.   Neurological: Positive for weakness, light-headedness, numbness (feet bilat.) and headaches. Negative for dizziness.   Psychiatric/Behavioral: Positive for sleep disturbance. Negative for agitation and suicidal ideas. The patient is not nervous/anxious.      Vitals:    03/10/20 0824   BP: Comment: unable to take bp, pt doesn't have doppler   Resp: 20   Temp: 97.8 °F (36.6 °C)   SpO2: 91%   Weight: (!) 154 kg (340 lb)  Comment: pt std wt   Height: 175.3 cm (69\")   PainSc:   6   PainLoc: Back     Objective   Physical Exam   Constitutional: He is oriented to person, place, and time. Vital signs are normal. He appears well-developed and well-nourished.   HENT:   Head: Normocephalic and atraumatic.   Eyes: Conjunctivae, EOM and lids are normal.   Neck: Trachea normal and normal range of motion. Neck supple.   Cardiovascular:   LVAD-mechanical hum noted   Pulmonary/Chest: Effort normal and breath sounds normal. "   Abdominal: Normal appearance and bowel sounds are normal.   Musculoskeletal:        Right knee: He exhibits decreased range of motion. Tenderness found.        Left knee: He exhibits decreased range of motion. Tenderness found.        Lumbar back: He exhibits decreased range of motion, tenderness and pain.   Neurological: He is alert and oriented to person, place, and time. Gait (motorized wheelchair) abnormal.   Skin: Skin is warm, dry and intact.   Psychiatric: He has a normal mood and affect. His speech is normal and behavior is normal. Judgment and thought content normal.   Nursing note and vitals reviewed.    Assessment/Plan   Artie was seen today for back pain.    Diagnoses and all orders for this visit:    Encounter for long-term (current) use of high-risk medication    DDD (degenerative disc disease), lumbar    Lumbar radiculopathy    Osteoarthritis of both knees, unspecified osteoarthritis type    Other chronic pain      --- The urine drug screen confirmation from 1/9/2020 has been reviewed and the result is appropriate based on patient history and NATALIE report  --- Refill [place] 10/325. DNF 3/15/2020 applied. Patient appears stable with current regimen. No adverse effects. Regarding continuation of opioids, there is no evidence of aberrant behavior or any red flags.  The patient continues with appropriate response to opioid therapy. ADL's remain intact by self.   --- Follow-up 2 months or sooner if needed     NATALIE REPORT    As part of the patient's treatment plan, I am prescribing controlled substances. The patient has been made aware of appropriate use of such medications, including potential risk of somnolence, limited ability to drive and/or work safely, and the potential for dependence or overdose. It has also bee made clear that these medications are for use by this patient only, without concomitant use of alcohol or other substances unless prescribed.     Patient has completed prescribing  agreement detailing terms of continued prescribing of controlled substances, including monitoring NATALIE reports, urine drug screening, and pill counts if necessary. The patient is aware that inappropriate use will results in cessation of prescribing such medications.    NATALIE report has been reviewed and scanned into the patient's chart.    As the clinician, I personally reviewed the NATALIE from 3/6/2020 while the patient was in the office today.    History and physical exam exhibit continued safe and appropriate use of controlled substances.    EMR Dragon/Transcription disclaimer:   Much of this encounter note is an electronic transcription/translation of spoken language to printed text. The electronic translation of spoken language may permit erroneous, or at times, nonsensical words or phrases to be inadvertently transcribed; Although I have reviewed the note for such errors, some may still exist.

## 2020-04-09 RX ORDER — HYDROCODONE BITARTRATE AND ACETAMINOPHEN 10; 325 MG/1; MG/1
1 TABLET ORAL EVERY 6 HOURS PRN
Qty: 120 TABLET | Refills: 0 | Status: SHIPPED | OUTPATIENT
Start: 2020-04-14 | End: 2020-05-06 | Stop reason: SDUPTHER

## 2020-04-09 NOTE — TELEPHONE ENCOUNTER
Medication Refill Request    Date of phone call: 20    Medication being requested: norco 10/325mg si tab po q 6 hours prn   Qty: 120    Date of last visit: 3/10/20    Date of last refill: 3/15/20    NATALIE up to date?: yes    Next Follow up?: 20    Any new pertinent information? (i.e, new medication allergies, new use of medications, change in patient's health or condition, non-compliance or inconsistency with prescribing agreement?):

## 2020-04-14 RX ORDER — GABAPENTIN 300 MG/1
CAPSULE ORAL
Qty: 120 CAPSULE | Refills: 0 | Status: SHIPPED | OUTPATIENT
Start: 2020-04-14 | End: 2020-05-06 | Stop reason: SDUPTHER

## 2020-04-14 NOTE — TELEPHONE ENCOUNTER
Problem: Occupational Therapy Goal  Goal: Occupational Therapy Goal  Goals updated on 1/10/19 to be met 2/9/19  Pt to be properly positioned 100% of time by family & staff - MET  Pt will remain in quiet organized state for 90% of session - MET  Pt will tolerate tactile stimulation with no signs of stress during 3 consecutive sessions - NOT MET  Parents will demonstrate dev handling caregiving techniques while pt is calm & organized - MET (mom only; not observed with dad)  Pt will tolerate prom to all 4 extremities with no tightness noted - PROGRESSING (tightness in B scapular region; hips)  Pt will bring B hands to mouth & midline 5-7 times per session - MET X1  Pt will visually track toy horizontally and vertically 3/4x per session. - MET  Pt will reach for toy 3/4x per session. - MET X2; inconsistent  Pt will actively grasp toy 3/4x per session - MET X1; inconsistent  Pt will suck pacifier with good suck & latch in prep for oral fdg - PROGRESSING  Family will be independent with hep for development stimulation - MET (mom)           Outcome: Outcome(s) achieved Date Met: 02/05/19  Goals partially met. Recommend follow-up with outpatient OT and PT and [facility].       refill

## 2020-05-06 ENCOUNTER — OFFICE VISIT (OUTPATIENT)
Dept: PAIN MEDICINE | Facility: CLINIC | Age: 55
End: 2020-05-06

## 2020-05-06 DIAGNOSIS — M54.16 LUMBAR RADICULOPATHY: ICD-10-CM

## 2020-05-06 DIAGNOSIS — M17.0 OSTEOARTHRITIS OF BOTH KNEES, UNSPECIFIED OSTEOARTHRITIS TYPE: ICD-10-CM

## 2020-05-06 DIAGNOSIS — G89.29 OTHER CHRONIC PAIN: ICD-10-CM

## 2020-05-06 DIAGNOSIS — Z79.899 ENCOUNTER FOR LONG-TERM (CURRENT) USE OF HIGH-RISK MEDICATION: Primary | ICD-10-CM

## 2020-05-06 DIAGNOSIS — Z96.652 HISTORY OF TOTAL LEFT KNEE REPLACEMENT: ICD-10-CM

## 2020-05-06 DIAGNOSIS — M51.36 DDD (DEGENERATIVE DISC DISEASE), LUMBAR: ICD-10-CM

## 2020-05-06 PROCEDURE — 99442 PR PHYS/QHP TELEPHONE EVALUATION 11-20 MIN: CPT | Performed by: NURSE PRACTITIONER

## 2020-05-06 RX ORDER — GABAPENTIN 300 MG/1
300 CAPSULE ORAL 4 TIMES DAILY
Qty: 120 CAPSULE | Refills: 5 | Status: SHIPPED | OUTPATIENT
Start: 2020-05-06

## 2020-05-06 RX ORDER — HYDROCODONE BITARTRATE AND ACETAMINOPHEN 10; 325 MG/1; MG/1
1 TABLET ORAL EVERY 6 HOURS PRN
Qty: 120 TABLET | Refills: 0 | Status: SHIPPED | OUTPATIENT
Start: 2020-05-06 | End: 2020-06-09 | Stop reason: SDUPTHER

## 2020-05-06 RX ORDER — FERROUS SULFATE 325(65) MG
TABLET ORAL
COMMUNITY
Start: 2020-03-25

## 2020-05-06 RX ORDER — ERGOCALCIFEROL 1.25 MG/1
CAPSULE ORAL
COMMUNITY
Start: 2020-04-02

## 2020-05-06 NOTE — PROGRESS NOTES
You have chosen to receive care through a telephone visit. Do you consent to use a telephone visit for your medical care today? Yes    TELEPHONE VISIT    CHIEF COMPLAINT: F/u back and knee pain    Subjective   Artie Cameron is a 55 y.o. male  who presents for a telephonic follow-up.He has a history of back and knee pain.  Today his pain is 5/10VAS in severity. Continues with Hydrocodone 10/325 4/day, Tizanidine 4mg 2-3/day, and gabapentin 600 mg 2/day.  His medication regimen decreases his pain by 50%.  He denies any side effects including somnolence or constipation.  ADLs by self.  He denies any changes to bowel or bladder since last office visit.     Knee Pain    Incident onset: chronic. The pain is present in the left knee and right knee. The quality of the pain is described as aching. The pain is at a severity of 5/10. The pain has been worsening since onset. Associated symptoms include an inability to bear weight, a loss of motion and numbness (feet bilat.). The symptoms are aggravated by movement and weight bearing. He has tried rest (norco) for the symptoms. The treatment provided moderate relief.   Back Pain   This is a chronic problem. The problem occurs intermittently. The problem is unchanged. The pain is present in the lumbar spine. The quality of the pain is described as aching and burning. The pain is at a severity of 5/10. The pain is moderate. Associated symptoms include headaches, numbness (feet bilat.) and weakness. Pertinent negatives include no abdominal pain, bladder incontinence, bowel incontinence, chest pain, dysuria or fever. Risk factors include lack of exercise, obesity and sedentary lifestyle. He has tried analgesics and heat (gabapentin) for the symptoms. The treatment provided moderate relief.      The following portions of the patient's history were reviewed and updated as appropriate: allergies, current medications, past family history, past medical history, past social history, past  surgical history and problem list.    Review of Systems   Constitutional: Negative for fever.   HENT: Negative for sore throat.    Respiratory: Negative for cough and shortness of breath.    Cardiovascular: Negative for chest pain.   Gastrointestinal: Negative for abdominal pain and bowel incontinence.   Genitourinary: Negative for bladder incontinence and dysuria.   Musculoskeletal: Positive for back pain.   Neurological: Positive for weakness, numbness (feet bilat.) and headaches.   Psychiatric/Behavioral: Negative for confusion.     There were no vitals filed for this visit.    Objective   Physical Exam  As this is a telephone check-in, the ability to perform a routine physical exam is extremely limited. The patient seems alert and is oriented appropriately.   On this phone call there is not any evidence of respiratory distress.   The patient seems of normal mood.   The remainder of a routine physical exam is deferred.    Assessment/Plan   Diagnoses and all orders for this visit:    Encounter for long-term (current) use of high-risk medication    History of total left knee replacement    DDD (degenerative disc disease), lumbar    Osteoarthritis of both knees, unspecified osteoarthritis type    Lumbar radiculopathy    Other chronic pain      ----------------    Our practice is offering alternative &/or electronic methods to continue to follow our patients while at the same time further the efforts toward social distancing, in accordance with our organizational policies, professional societies' guidance, and gubernatorial mandates.  I support the Healthy at Home campaign and in this visit I have counseled the patient on our needs to limit in-person office visits and physical encounters with medical facilities whenever possible.  I have also educated the patient on the medical necessities of maintaining social distancing while we continue to function during this crisis period.      ----------------    --- This visit  has been rescheduled as a phone visit to comply with patient safety concerns in accordance with CDC recommendations. Total time of discussion was 12 minutes.  --- The urine drug screen confirmation from 1/9/2020 has been reviewed and the result is appropriate based on patient history and NATALIE report  --- Refill [place] 10/325. DNF 5/14/2020 applied. Patient appears stable with current regimen. No adverse effects. Regarding continuation of opioids, there is no evidence of aberrant behavior or any red flags.  The patient continues with appropriate response to opioid therapy. ADL's remain intact by self.   --- Refill Gabapentin 300mg 2 tablets in the morning and 2 tablets at HS.   --- Follow-up 2 months or sooner if needed     NATALIE REPORT    As part of the patient's treatment plan, I am prescribing controlled substances. The patient has been made aware of appropriate use of such medications, including potential risk of somnolence, limited ability to drive and/or work safely, and the potential for dependence or overdose. It has also been made clear that these medications are for use by this patient only, without concomitant use of alcohol or other substances unless prescribed.     Patient has completed prescribing agreement detailing terms of continued prescribing of controlled substances, including monitoring NATALIE reports, urine drug screening, and pill counts if necessary. The patient is aware that inappropriate use will results in cessation of prescribing such medications.    NATALIE report has been reviewed and scanned into the patient's chart.    As the clinician, I personally reviewed the NATALIE from 5/5/2020 while the patient was on the telephonic visit today.    History and physical exam exhibit continued safe and appropriate use of controlled substances.    -------    EMR Dragon/Transcription disclaimer:   Much of this encounter note is an electronic transcription/translation of spoken language to printed  text. The electronic translation of spoken language may permit erroneous, or at times, nonsensical words or phrases to be inadvertently transcribed; Although I have reviewed the note for such errors, some may still exist.

## 2020-06-09 RX ORDER — HYDROCODONE BITARTRATE AND ACETAMINOPHEN 10; 325 MG/1; MG/1
1 TABLET ORAL EVERY 6 HOURS PRN
Qty: 120 TABLET | Refills: 0 | Status: SHIPPED | OUTPATIENT
Start: 2020-06-13 | End: 2020-06-29 | Stop reason: SDUPTHER

## 2020-06-29 ENCOUNTER — OFFICE VISIT (OUTPATIENT)
Dept: PAIN MEDICINE | Facility: CLINIC | Age: 55
End: 2020-06-29

## 2020-06-29 VITALS
WEIGHT: 315 LBS | BODY MASS INDEX: 46.65 KG/M2 | TEMPERATURE: 98.3 F | RESPIRATION RATE: 20 BRPM | HEART RATE: 84 BPM | OXYGEN SATURATION: 94 % | HEIGHT: 69 IN

## 2020-06-29 DIAGNOSIS — Z79.899 ENCOUNTER FOR LONG-TERM (CURRENT) USE OF HIGH-RISK MEDICATION: Primary | ICD-10-CM

## 2020-06-29 DIAGNOSIS — G89.29 OTHER CHRONIC PAIN: ICD-10-CM

## 2020-06-29 DIAGNOSIS — M54.16 LUMBAR RADICULOPATHY: ICD-10-CM

## 2020-06-29 DIAGNOSIS — M17.0 OSTEOARTHRITIS OF BOTH KNEES, UNSPECIFIED OSTEOARTHRITIS TYPE: ICD-10-CM

## 2020-06-29 DIAGNOSIS — M51.36 DDD (DEGENERATIVE DISC DISEASE), LUMBAR: ICD-10-CM

## 2020-06-29 PROCEDURE — 99214 OFFICE O/P EST MOD 30 MIN: CPT | Performed by: NURSE PRACTITIONER

## 2020-06-29 RX ORDER — TIZANIDINE 4 MG/1
4 TABLET ORAL EVERY 8 HOURS
Qty: 270 TABLET | Refills: 0 | Status: SHIPPED | OUTPATIENT
Start: 2020-06-29

## 2020-06-29 RX ORDER — HYDROCODONE BITARTRATE AND ACETAMINOPHEN 10; 325 MG/1; MG/1
1 TABLET ORAL EVERY 6 HOURS PRN
Qty: 120 TABLET | Refills: 0 | Status: SHIPPED | OUTPATIENT
Start: 2020-06-29

## 2020-06-29 NOTE — PROGRESS NOTES
"CHIEF COMPLAINT  F/u back pain and bilateral knee pain. Pt sts pain is the same, pain is based on activity.    Subjective   Artie Cameron is a 55 y.o. male  who presents for follow-up.  He has a history of back and bilateral knee pain.  Today his pain is 7/10VAS in severity.  Continues with Hydrocodone 10/325 4/day, Tizanidine 4mg 3/day, and gabapentin 600 mg 2/day. This medication regimen decreases his pain by a moderate amount. He denies any side effects including somnolence or constipation.      He complains today of worsening pain in his right knee and ankle, he states he had a gout flare after father's day which he attributes to his father's day meal. He asks if we are able to draw any fluid off of his right knee today, I explained that our office does not do that procedure.  He then states \"yeah, [facility] wants to approve every procedure I have anyway\".  I recommend that he ask his team at [facility] for referral recommendations regarding his knee. He states he is scheduled to see his heart specialists with [facility] on 7/15/2020. He has been unable to see them since the pandemic started and he states he is due for an Echocardiogram.     Knee Pain    Incident onset: chronic. The pain is present in the left knee and right knee. The quality of the pain is described as aching. The pain is at a severity of 7/10. The pain has been worsening since onset. Associated symptoms include an inability to bear weight, a loss of motion and numbness (feet bilat.). The symptoms are aggravated by movement and weight bearing. He has tried rest (norco) for the symptoms. The treatment provided moderate relief.   Back Pain   This is a chronic problem. The problem occurs intermittently. The problem is unchanged. The pain is present in the lumbar spine. The quality of the pain is described as aching and burning. The pain is at a severity of 7/10. The pain is moderate. Associated symptoms include headaches (occ) and numbness (feet bilat.). " "Pertinent negatives include no abdominal pain, bladder incontinence, bowel incontinence, chest pain, dysuria, fever or weakness. Risk factors include lack of exercise, obesity and sedentary lifestyle. He has tried analgesics and heat (gabapentin) for the symptoms. The treatment provided moderate relief.      PEG Assessment   What number best describes your pain on average in the past week?7  What number best describes how, during the past week, pain has interfered with your enjoyment of life?7  What number best describes how, during the past week, pain has interfered with your general activity?  7    The following portions of the patient's history were reviewed and updated as appropriate: allergies, current medications, past family history, past medical history, past social history, past surgical history and problem list.    Review of Systems   Constitutional: Negative for activity change, fatigue and fever.   HENT: Negative for congestion and sore throat.    Eyes: Negative for visual disturbance.   Respiratory: Negative for cough and shortness of breath.    Cardiovascular: Negative for chest pain.   Gastrointestinal: Negative for abdominal pain, bowel incontinence, constipation and diarrhea.   Genitourinary: Negative for bladder incontinence, difficulty urinating and dysuria.   Musculoskeletal: Positive for back pain, gait problem (w/c) and joint swelling (bilat feet).   Neurological: Positive for numbness (feet bilat.) and headaches (occ). Negative for dizziness, weakness and light-headedness.   Psychiatric/Behavioral: Negative for agitation, confusion, sleep disturbance and suicidal ideas.     Vitals:    06/29/20 0803   BP: Comment: unable to take bp pt doesn't have doppler   Pulse: 84   Resp: 20   Temp: 98.3 °F (36.8 °C)   SpO2: 94%   Weight: (!) 145 kg (320 lb)  Comment: pt std wt, unable to weigh in office   Height: 175.3 cm (69\")   PainSc:   7   PainLoc: Back     Objective   Physical Exam   Constitutional: He " is oriented to person, place, and time. Vital signs are normal. He appears well-developed and well-nourished.   HENT:   Head: Normocephalic and atraumatic.   Eyes: Conjunctivae, EOM and lids are normal.   Neck: Trachea normal and normal range of motion. Neck supple.   Cardiovascular:   LVAD-mechanical hum noted   Pulmonary/Chest: Effort normal.   Abdominal: Normal appearance and bowel sounds are normal.   Musculoskeletal:        Right knee: He exhibits decreased range of motion. Tenderness found.        Left knee: He exhibits decreased range of motion. Tenderness found.        Right ankle: He exhibits swelling. Tenderness.        Lumbar back: He exhibits decreased range of motion, tenderness and pain.   Neurological: He is alert and oriented to person, place, and time. Gait (motorized wheelchair) abnormal.   Skin: Skin is warm, dry and intact.   Psychiatric: He has a normal mood and affect. His speech is normal and behavior is normal. Judgment and thought content normal.   Nursing note and vitals reviewed.    Assessment/Plan   Artie was seen today for back pain.    Diagnoses and all orders for this visit:    Encounter for long-term (current) use of high-risk medication    DDD (degenerative disc disease), lumbar    Osteoarthritis of both knees, unspecified osteoarthritis type    Other chronic pain    Lumbar radiculopathy    Other orders  -     tiZANidine (ZANAFLEX) 4 MG tablet; Take 1 tablet by mouth Every 8 (Eight) Hours.      --- The urine drug screen confirmation from 1/9/2020 has been reviewed and the result is appropriate based on patient history and NATALIE report  --- Refill [place] 10/325. DNF 7/13/2020 applied. Patient appears stable with current regimen. No adverse effects. Regarding continuation of opioids, there is no evidence of aberrant behavior or any red flags.  The patient continues with appropriate response to opioid therapy. ADL's remain intact by self.   --- Follow-up 2 months or sooner if needed      NATALIE REPORT    As part of the patient's treatment plan, I am prescribing controlled substances. The patient has been made aware of appropriate use of such medications, including potential risk of somnolence, limited ability to drive and/or work safely, and the potential for dependence or overdose. It has also bee made clear that these medications are for use by this patient only, without concomitant use of alcohol or other substances unless prescribed.     Patient has completed prescribing agreement detailing terms of continued prescribing of controlled substances, including monitoring NATALIE reports, urine drug screening, and pill counts if necessary. The patient is aware that inappropriate use will results in cessation of prescribing such medications.    NATALIE report has been reviewed and scanned into the patient's chart.    As the clinician, I personally reviewed the NATALIE from 6/29/2020.    History and physical exam exhibit continued safe and appropriate use of controlled substances.    EMR Dragon/Transcription disclaimer:   Much of this encounter note is an electronic transcription/translation of spoken language to printed text. The electronic translation of spoken language may permit erroneous, or at times, nonsensical words or phrases to be inadvertently transcribed; Although I have reviewed the note for such errors, some may still exist.

## 2020-09-17 ENCOUNTER — TELEPHONE (OUTPATIENT)
Dept: PAIN MEDICINE | Facility: CLINIC | Age: 55
End: 2020-09-17

## 2020-09-17 NOTE — TELEPHONE ENCOUNTER
Pt called to let us know he is currently in the hospital, his defib went off twice overnight. He has cancelled his appt with you for tomorrow. He was concerned about norco refill but sts he will call to schedule an appt with us once d/c'd from hospital.  Please advise. Thank you.

## 2024-11-04 NOTE — PROGRESS NOTES
CHIEF COMPLAINT  F/U back and Knee pain- patient states that his pain has remained the same    Subjective   Artie Cameron is a 54 y.o. male  who presents to the office for follow-up.He has a history of back and knee pain.    C/o back and knee pain. Pain today 6/10 in severity.  Continues with Hydrocodone 10/325 4/day and gabapentin 600 mg 3/day. Reports moderate reduction in pain with this regimen. Denies any side effects from the regimen. This helps decrease his pain moderately. ADL's by self.     Knee Pain    Incident onset: chronic. The pain is present in the left knee and right knee. The quality of the pain is described as aching. The pain is at a severity of 6/10. The pain is severe. The pain has been worsening since onset. Associated symptoms include an inability to bear weight, a loss of motion and numbness (feet bilat.). The symptoms are aggravated by movement and weight bearing. He has tried rest (norco) for the symptoms. The treatment provided moderate relief.   Back Pain   This is a new problem. The problem occurs intermittently. The problem has been gradually worsening (unchanged from last office visit) since onset. The pain is present in the lumbar spine. The quality of the pain is described as aching and burning. The pain is at a severity of 6/10. The pain is moderate. Associated symptoms include numbness (feet bilat.) and weakness. Pertinent negatives include no abdominal pain, bladder incontinence, bowel incontinence, chest pain, dysuria, fever or headaches. Risk factors include lack of exercise, obesity and sedentary lifestyle. He has tried analgesics and heat (gabapentin) for the symptoms. The treatment provided moderate relief.      PEG Assessment   What number best describes your pain on average in the past week?6  What number best describes how, during the past week, pain has interfered with your enjoyment of life?6  What number best describes how, during the past week, pain has interfered with  "your general activity?  6    The following portions of the patient's history were reviewed and updated as appropriate: allergies, current medications, past family history, past medical history, past social history, past surgical history and problem list.    Review of Systems   Constitutional: Positive for fatigue (sometimes). Negative for activity change and fever.   HENT: Negative for congestion.    Respiratory: Positive for shortness of breath (occ on 2liters O2 at home, and duoneb treatments ).    Cardiovascular: Negative for chest pain.   Gastrointestinal: Negative for abdominal pain, bowel incontinence, constipation and diarrhea.   Genitourinary: Negative for bladder incontinence, difficulty urinating and dysuria.   Musculoskeletal: Positive for arthralgias (RIGHT KNEE), back pain and gait problem (W/C). Negative for neck stiffness.   Neurological: Positive for weakness and numbness (feet bilat.). Negative for dizziness and headaches.   Psychiatric/Behavioral: Positive for sleep disturbance. Negative for agitation and suicidal ideas.     Vitals:    11/07/19 0747   Pulse: 68   Resp: 16   Temp: 97.7 °F (36.5 °C)   SpO2: 97%   Weight: (!) 151 kg (332 lb 6.4 oz)   Height: 175.3 cm (69\")   PainSc:   6   PainLoc: Back     Objective   Physical Exam   Constitutional: He is oriented to person, place, and time. He appears well-developed and well-nourished. No distress.   HENT:   Head: Normocephalic and atraumatic.   Eyes: Conjunctivae and EOM are normal.   Neck: Neck supple.   Cardiovascular: Normal rate.   Pulmonary/Chest: Effort normal. No respiratory distress.   Musculoskeletal:        Right knee: Tenderness found.        Left knee: Tenderness found.        Lumbar back: He exhibits tenderness.   Neurological: He is alert and oriented to person, place, and time. He has normal strength. No sensory deficit. Gait (wheelchair) abnormal.   Skin: Skin is warm and dry. He is not diaphoretic.   Psychiatric: He has a normal " mood and affect. His behavior is normal.   Nursing note and vitals reviewed.    Assessment/Plan   Artie was seen today for back pain.    Diagnoses and all orders for this visit:    Other chronic pain    DDD (degenerative disc disease), lumbar    Lumbar radiculopathy    Osteoarthritis of both knees, unspecified osteoarthritis type    Encounter for long-term (current) use of high-risk medication    Other orders  -     HYDROcodone-acetaminophen (NORCO)  MG per tablet; Take 1 tablet by mouth Every 6 (Six) Hours As Needed for Moderate Pain . DNF until 11-17-19      --- Refill Hydrocodone. Patient appears stable with current regimen. No adverse effects. Regarding continuation of opioids, there is no evidence of aberrant behavior or any red flags.  The patient continues with appropriate response to opioid therapy. ADL's remain intact by self.   --- The urine drug screen confirmation from 5/28/19 has been reviewed and the result is appropriate based on patient history and NATALIE report  --- Follow-up 2 months          NATALIE REPORT  As part of the patient's treatment plan, I am prescribing controlled substances. The patient has been made aware of appropriate use of such medications, including potential risk of somnolence, limited ability to drive and/or work safely, and the potential for dependence or overdose. It has also bee made clear that these medications are for use by this patient only, without concomitant use of alcohol or other substances unless prescribed.     Patient has completed prescribing agreement detailing terms of continued prescribing of controlled substances, including monitoring NATALIE reports, urine drug screening, and pill counts if necessary. The patient is aware that inappropriate use will results in cessation of prescribing such medications.    NATALIE report has been reviewed and scanned into the patient's chart.    As the clinician, I personally reviewed the NATALIE from 11/7/19 while the  patient was in the office today.    History and physical exam exhibit continued safe and appropriate use of controlled substances.    EMR Dragon/Transcription disclaimer:   Much of this encounter note is an electronic transcription/translation of spoken language to printed text. The electronic translation of spoken language may permit erroneous, or at times, nonsensical words or phrases to be inadvertently transcribed; Although I have reviewed the note for such errors, some may still exist.     [FreeTextEntry1] : Establish Care [de-identified] : Kashmir Huerta is a 48 year old female with PMH of DM who presents to clinic to establish care. Patient referred by OB/GYN provider for establishing care and management of DM/HTN. Patient reports she came to [place] for visiting about 2 weeks ago. Patient states she went to the Emergency Department because of shortness of breath and abdominal distension. Patient was found to have adnexal mass and seen by Gynecology. She has follow up appointment scheduled with Gyn Oncology for surgical evaluation. Patient does not take any prescribed medications. She reports a history of hypertension on mother's side and diabetes on her fathers side. She reports having a tubal ligation prior, no other surgeries. She denies any tobacco or recreational drug use.